# Patient Record
Sex: MALE | Race: WHITE | NOT HISPANIC OR LATINO | Employment: OTHER | URBAN - METROPOLITAN AREA
[De-identification: names, ages, dates, MRNs, and addresses within clinical notes are randomized per-mention and may not be internally consistent; named-entity substitution may affect disease eponyms.]

---

## 2017-01-27 ENCOUNTER — ALLSCRIPTS OFFICE VISIT (OUTPATIENT)
Dept: OTHER | Facility: OTHER | Age: 42
End: 2017-01-27

## 2017-05-23 ENCOUNTER — GENERIC CONVERSION - ENCOUNTER (OUTPATIENT)
Dept: OTHER | Facility: OTHER | Age: 42
End: 2017-05-23

## 2017-05-24 LAB
A/G RATIO (HISTORICAL): 1.4 (ref 1.2–2.2)
ALBUMIN SERPL BCP-MCNC: 4.1 G/DL (ref 3.5–5.5)
ALP SERPL-CCNC: 63 IU/L (ref 39–117)
ALT SERPL W P-5'-P-CCNC: 15 IU/L (ref 0–44)
AST SERPL W P-5'-P-CCNC: 16 IU/L (ref 0–40)
BILIRUB SERPL-MCNC: 0.3 MG/DL (ref 0–1.2)
BUN SERPL-MCNC: 11 MG/DL (ref 6–24)
BUN/CREA RATIO (HISTORICAL): 14 (ref 9–20)
CALCIUM SERPL-MCNC: 9.2 MG/DL (ref 8.7–10.2)
CHLORIDE SERPL-SCNC: 99 MMOL/L (ref 96–106)
CHOLEST SERPL-MCNC: 195 MG/DL (ref 100–199)
CO2 SERPL-SCNC: 27 MMOL/L (ref 18–29)
CREAT SERPL-MCNC: 0.78 MG/DL (ref 0.76–1.27)
EGFR AFRICAN AMERICAN (HISTORICAL): 128 ML/MIN/1.73
EGFR-AMERICAN CALC (HISTORICAL): 111 ML/MIN/1.73
GLUCOSE SERPL-MCNC: 98 MG/DL (ref 65–99)
HBA1C MFR BLD HPLC: 6 % (ref 4.8–5.6)
HDLC SERPL-MCNC: 44 MG/DL
INTERPRETATION (HISTORICAL): NORMAL
LDLC SERPL CALC-MCNC: 135 MG/DL (ref 0–99)
POTASSIUM SERPL-SCNC: 4.2 MMOL/L (ref 3.5–5.2)
SODIUM SERPL-SCNC: 142 MMOL/L (ref 134–144)
TOT. GLOBULIN, SERUM (HISTORICAL): 3 G/DL (ref 1.5–4.5)
TOTAL PROTEIN (HISTORICAL): 7.1 G/DL (ref 6–8.5)
TRIGL SERPL-MCNC: 78 MG/DL (ref 0–149)

## 2017-05-25 ENCOUNTER — GENERIC CONVERSION - ENCOUNTER (OUTPATIENT)
Dept: OTHER | Facility: OTHER | Age: 42
End: 2017-05-25

## 2017-05-26 ENCOUNTER — ALLSCRIPTS OFFICE VISIT (OUTPATIENT)
Dept: OTHER | Facility: OTHER | Age: 42
End: 2017-05-26

## 2017-12-01 ENCOUNTER — ALLSCRIPTS OFFICE VISIT (OUTPATIENT)
Dept: OTHER | Facility: OTHER | Age: 42
End: 2017-12-01

## 2017-12-05 NOTE — PROGRESS NOTES
Assessment    1  Acute maxillary sinusitis, recurrence not specified (461 0) (J01 00)   2  Benign essential hypertension (401 1) (I10)   3  BMI 50 0-59 9, adult (V85 43) (Z68 43)   4  Morbid obesity (278 01) (E66 01)    Plan  Acute maxillary sinusitis, recurrence not specified    · Cefdinir 300 MG Oral Capsule; TAKE 2 CAPSULES DAILY  Morbid obesity    · Begin a limited exercise program ; Status:Complete;   Done: 12XDL9191   · Eat a low fat and low cholesterol diet ; Status:Complete;   Done: 68MTO0286   · Shared Decision Making Aid given; Status:Complete;   Done: 17RDM1146   · Some eating tips that can help you lose weight ; Status:Complete;   Done: 61HVF9579   · We recommend that you bring your body mass index down to 26 ; Status:Complete;  Done: 07HAB0041   · Follow Up if Not Better Evaluation and Treatment  Follow-up  Status: Complete  Done:34Lyw6036    Discussion/Summary  Possible side effects of new medications were reviewed with the patient/guardian today  The treatment plan was reviewed with the patient/guardian  The patient/guardian understands and agrees with the treatment plan      Provider Comments  Provider Comments:   htn stablemucinex dmnew      Chief Complaint  Pt c/o sinus pressure for the past days  er/cma  History of Present Illness  HPI: sinsu pressureeyesall hqud4fftfcwht DMis thick and discolored, yellowallergy medsno temp  cdlcpe after labsstable      Review of Systems   Cardiovascular: no chest pain-- and-- no lower extremity edema  Respiratory: no shortness of breath  Active Problems    1  Acute maxillary sinusitis, recurrence not specified (461 0) (J01 00)   2  Acute URI (465 9) (J06 9)   3  Allergic rhinitis (477 9) (J30 9)   4  Benign essential hypertension (401 1) (I10)   5  BMI 50 0-59 9, adult (V85 43) (Z68 43)   6  Chronic reflux esophagitis (530 11) (K21 0)   7  Colon cancer screening (V76 51) (Z12 11)   8  Depression screening (V79 0) (Z13 89)   9   Gout (274 9) (M10 9) 10  IFG (impaired fasting glucose) (790 21) (R73 01)   11  Immunization due (V05 9) (Z23)   12  Localized, primary osteoarthritis of lower leg, unspecified laterality   13  Morbid obesity (278 01) (E66 01)   14  Obstructive sleep apnea (327 23) (G47 33)   15  Pre-diabetes (790 29) (R73 03)   16  Prostate cancer screening (V76 44) (Z12 5)   17  Screening for cardiovascular, respiratory, and genitourinary diseases  (V81 2,V81 4,V81 6) (Z13 6,Z13 83,Z13 89)   18  Screening for diabetes mellitus (DM) (V77 1) (Z13 1)   19  Thyroid disorder screening (V77 0) (Z13 29)   20  Well adult on routine health check (V70 0) (Z00 00)    Past Medical History  1  _ (465)   2  _ (461)   3  History of Acute bronchitis, unspecified organism (466 0) (J20 9)   4  Acute maxillary sinusitis (461 0) (J01 00)   5  History of Acute maxillary sinusitis (461 0) (J01 00)   6  History of Acute maxillary sinusitis (461 0) (J01 00)   7  History of Acute maxillary sinusitis (461 0) (J01 00)   8  History of Acute maxillary sinusitis (461 0) (J01 00)   9  History of Acute maxillary sinusitis, recurrence not specified (461 0) (J01 00)   10  History of Acute maxillary sinusitis, recurrence not specified (461 0) (J01 00)   11  History of Acute serous otitis media, unspecified laterality   12  Acute upper respiratory infection (465 9) (J06 9)   13  History of Acute URI (465 9) (J06 9)   14  Ankle pain, unspecified laterality   15  History of Cellulitis (682 9) (L03 90)   16  Cough (786 2) (R05)   17  History of Generalized abdominal pain (789 07) (R10 84)   18  History of acute bronchitis (V12 69) (Z87 09)   19  History of acute bronchitis (V12 69) (Z87 09)   20  History of acute otitis media (V12 49) (Z86 69)   21  History of acute sinusitis (V12 69) (Z87 09)   22  History of hypertension (V12 59) (Z86 79)   23  History of seborrheic dermatitis (V13 3) (Z87 2)   24  History of shortness of breath (V13 89) (Z87 898)   25   History of viral infection (V12 09) (Z86 19)   26  History of Joint pain, knee (719 46) (M25 569)   27  History of Late effect of sprain and strain (905 7)   28  History of Loose Body In The Knee (717 6)   29  History of Neoplasm of bone (239 2) (D49 2)   30  Noninfectious gastroenteritis (558 9) (K52 9)   31  Otalgia, unspecified laterality (388 70) (H92 09)   32  History of Palpitations (785 1) (R00 2)   33  History of Patellar tendonitis, unspecified laterality   34  History of Prostate cancer screening (V76 44) (Z12 5)    Family History  Mother    1  Family history of malignant neoplasm of breast (V16 3) (Z80 3)  Father    2  Family history of atrial fibrillation (V17 49) (Z82 49)   3  Family history of sleep apnea (V19 8) (Z82 0)  Family History    4  Family history of Arthritis (V17 7)   5  Family history of Breast Cancer (V16 3)   6  Family history of Gastric Cancer (V16 0)   7  Family history of Liver Cancer  Family History Reviewed: The family history was reviewed and updated today  Social History   · Denied: History of Alcohol Use (History)   · Never A Smoker  The social history was reviewed and updated today  Surgical History    1  History of Cholecystectomy   2  History of Hand Surgery   3  History of Knee Surgery    Current Meds   1  Cetirizine HCl - 10 MG Oral Tablet; TAKE 1 TABLET DAILY AS DIRECTED; Therapy: (Recorded:20Yhw2589) to Recorded   2  DilTIAZem HCl ER Coated Beads 120 MG Oral Capsule Extended Release 24 Hour; take 1 capsule daily; Therapy: 20LZO2282 to (Evaluate:90Poi2370)  Requested for: 84NBI8203; Last Rx:90Gyk7472 Ordered   3  Losartan Potassium-HCTZ 100-12 5 MG Oral Tablet; Take 1 tablet daily; Therapy: 77UBF6680 to (Evaluate:24Fnt0714)  Requested for: 92TKF6713; Last Rx:17Auf7065 Ordered   4  Montelukast Sodium 10 MG Oral Tablet; Take 1 tablet daily; Therapy: 73AAV7259 to (PQAPSYWY:78XBS2889)  Requested for: 32NTS0220; Last Rx:79Bgk2034 Ordered   5   Omega-3 Fish Oil CAPS; take 1 capsule daily; Therapy: (Recorded:94Ang2988) to Recorded   6  Propranolol HCl  MG Oral Capsule Extended Release 24 Hour; take 1 capsule daily; Therapy: 03QTJ7558 to (Evaluate:22Jan2018)  Requested for: 85WBY6048 Recorded   7  Proventil  (90 Base) MCG/ACT Inhalation Aerosol Solution; 2 puffs q4hrs prn, swish and spit after each use; Therapy: 58NKP6739 to (Last Rx:18Oct2017)  Requested for: 67OGR4244 Ordered   8  RaNITidine HCl - 150 MG Oral Capsule; Take 1 capsule twice daily; Therapy: (Recorded:03Faq1675) to Recorded   9  Vitamin C 100 MG Oral Tablet; TAKE 1 TABLET DAILY AS DIRECTED; Therapy: 12JFH8312 to (Ardelle Maple) Recorded   10  Vitamin E 400 UNIT Oral Capsule; 1 DAILY Recorded    Allergies  1  No Known Drug Allergies    Vitals   Recorded: 69RAZ5948 03:15PM   Temperature 98 5 F   Heart Rate 88   Respiration 16   Systolic 028   Diastolic 76   Height 5 ft 10 in   Weight 378 lb    BMI Calculated 54 24   BSA Calculated 2 74       Physical Exam   Constitutional  General appearance: No acute distress, well appearing and well nourished  Eyes  Conjunctiva and lids: No swelling, erythema, or discharge  Pupils and irises: Equal, round and reactive to light  Ears, Nose, Mouth, and Throat  External inspection of ears and nose: Normal    Otoscopic examination: Tympanic membrance translucent with normal light reflex  Canals patent without erythema  Nasal mucosa, septum, and turbinates: Normal without edema or erythema  Oropharynx: Normal with no erythema, edema, exudate or lesions  Pulmonary  Respiratory effort: No increased work of breathing or signs of respiratory distress  Auscultation of lungs: Clear to auscultation, equal breath sounds bilaterally, no wheezes, no rales, no rhonci  Cardiovascular  Auscultation of heart: Normal rate and rhythm, normal S1 and S2, without murmurs  Examination of extremities for edema and/or varicosities: Normal    Abdomen  Abdomen: Abnormal   The abdomen was obese  Lymphatic  Palpation of lymph nodes in neck: No lymphadenopathy  Musculoskeletal  Gait and station: Normal    Digits and nails: Normal without clubbing or cyanosis  Skin  Skin and subcutaneous tissue: Normal without rashes or lesions  Psychiatric  Mood and affect: Normal          Results/Data  PHQ-2 Adult Depression Screening 96UIA8017 03:19PM User, s     Test Name Result Flag Reference   PHQ-2 Adult Depression Score 0       Over the last two weeks, how often have you been bothered by any of the following problems?  Little interest or pleasure in doing things: Not at all - 0 Feeling down, depressed, or hopeless: Not at all - 0   PHQ-2 Adult Depression Screening Negative           Signatures   Electronically signed by : Marge Downey DO; Dec  1 2017  3:42PM EST                       (Author)

## 2018-01-13 VITALS
TEMPERATURE: 98.4 F | HEIGHT: 70 IN | WEIGHT: 315 LBS | RESPIRATION RATE: 20 BRPM | DIASTOLIC BLOOD PRESSURE: 78 MMHG | BODY MASS INDEX: 45.1 KG/M2 | HEART RATE: 82 BPM | SYSTOLIC BLOOD PRESSURE: 128 MMHG

## 2018-01-13 VITALS
HEIGHT: 70 IN | RESPIRATION RATE: 24 BRPM | BODY MASS INDEX: 45.1 KG/M2 | DIASTOLIC BLOOD PRESSURE: 76 MMHG | HEART RATE: 84 BPM | WEIGHT: 315 LBS | SYSTOLIC BLOOD PRESSURE: 120 MMHG | TEMPERATURE: 99.2 F

## 2018-01-13 NOTE — PROGRESS NOTES
Assessment    1  Benign essential hypertension (401 1) (I10)   2  BMI 50 0-59 9, adult (V85 43) (Z68 43)   3  Chronic reflux esophagitis (530 11) (K21 0)   4  Allergic rhinitis (477 9) (J30 9)   5  Morbid obesity (278 01) (E66 01)   6  Gout (274 9) (M10 9)   7  Encounter for preventive health examination (V70 0) (Z00 00)   8  Pre-diabetes (790 29) (R73 03)   9  Obstructive sleep apnea (327 23) (G47 33)    Plan  Allergic rhinitis    · Levocetirizine Dihydrochloride 5 MG Oral Tablet   · Desloratadine 5 MG Oral Tablet; 1 every day   · Montelukast Sodium 10 MG Oral Tablet; Take 1 tablet daily  Benign essential hypertension    · DiltiaZEM HCl ER Coated Beads 120 MG Oral Capsule Extended Release 24  Hour; take 1 capsule daily   · Losartan Potassium-HCTZ 100-12 5 MG Oral Tablet; Take 1 tablet daily   · Propranolol HCl  MG Oral Capsule Extended Release 24 Hour; take 1  capsule daily   · Begin a limited exercise program ; Status:Complete;   Done: 45JTV5996   · Restrict the salt in your diet by avoiding highly salted foods ; Status:Complete;   Done:  21UBF4554   · Shared Decision Making Aid given; Status:Complete;   Done: 28SXA8499   · Follow-up visit in 6 months Evaluation and Treatment  Follow-up  Status: Complete -  Scheduling  Done: 93UAX9934 11:30AM  Benign essential hypertension, Chronic reflux esophagitis, Morbid obesity, Pre-diabetes    · (1) COMPREHENSIVE METABOLIC PANEL; Status:Active; Requested for:15Vij3925;    · (1) HEMOGLOBIN A1C; Status:Active; Requested for:80Nvr1107;    · (1) LIPID PANEL FASTING W DIRECT LDL REFLEX; Status:Active; Requested  for:18Xjw2644;   Immunization due    · Adacel 5-2-15 5 LF-MCG/0 5 Intramuscular Suspension    Chief Complaint  pt present for CPE  ac/cma      History of Present Illness  HM, Adult Male: The patient is being seen for a health maintenance evaluation  General Health: The patient's health since the last visit is described as good   Immunizations status: not up to date   tetanus due  Lifestyle:  He has weight concerns  He does not exercise regularly  He does not use tobacco    Screening:      Review of Systems    Cardiovascular: no chest pain  Respiratory: no shortness of breath  Gastrointestinal: no abdominal pain  Active Problems    1  Allergic rhinitis (477 9) (J30 9)   2  Benign essential hypertension (401 1) (I10)   3  BMI 50 0-59 9, adult (V85 43) (Z68 43)   4  Chronic reflux esophagitis (530 11) (K21 0)   5  Colon cancer screening (V76 51) (Z12 11)   6  Depression screening (V79 0) (Z13 89)   7  Gout (274 9) (M10 9)   8  Immunization due (V05 9) (Z23)   9  Localized, primary osteoarthritis of lower leg, unspecified laterality   10  Morbid obesity (278 01) (E66 01)   11  Obstructive sleep apnea (327 23) (G47 33)   12  Pre-diabetes (790 29) (R73 03)   13  Prostate cancer screening (V76 44) (Z12 5)   14  Screening for cardiovascular condition (V81 2) (Z13 6)   15  Screening for diabetes mellitus (DM) (V77 1) (Z13 1)   16  Thyroid disorder screening (V77 0) (Z13 29)   17   Well adult on routine health check (V70 0) (Z00 00)    Past Medical History    · _ (465)   · _ (461)   · History of Acute bronchitis, unspecified organism (466 0) (J20 9)   · Acute maxillary sinusitis (461 0) (J01 00)   · History of Acute maxillary sinusitis (461 0) (J01 00)   · History of Acute maxillary sinusitis (461 0) (J01 00)   · History of Acute maxillary sinusitis (461 0) (J01 00)   · History of Acute maxillary sinusitis (461 0) (J01 00)   · History of Acute maxillary sinusitis, recurrence not specified (461 0) (J01 00)   · History of Acute serous otitis media, unspecified laterality   · Acute upper respiratory infection (465 9) (J06 9)   · Ankle pain, unspecified laterality   · History of Cellulitis (682 9) (L03 90)   · Cough (786 2) (R05)   · History of Generalized abdominal pain (789 07) (R10 84)   · History of acute bronchitis (V12 69) (Z87 09)   · History of acute bronchitis (V12 69) (Z87 09)   · History of acute otitis media (V12 49) (Z86 69)   · History of acute sinusitis (V12 69) (Z87 09)   · History of hypertension (V12 59) (Z86 79)   · History of seborrheic dermatitis (V13 3) (Z87 2)   · History of shortness of breath (V13 89) (U92 398)   · History of viral infection (V12 09) (Z86 19)   · History of Joint pain, knee (719 46) (M25 569)   · History of Late effect of sprain and strain (905 7)   · History of Loose Body In The Knee (717 6)   · History of Neoplasm of bone (239 2) (D49 2)   · Noninfectious gastroenteritis (558 9) (K52 9)   · Otalgia, unspecified laterality (388 70) (H92 09)   · History of Palpitations (785 1) (R00 2)   · History of Patellar tendonitis, unspecified laterality   · History of Prostate cancer screening (V76 44) (Z12 5)    Surgical History    · History of Cholecystectomy   · History of Hand Surgery   · History of Knee Surgery    Family History  Mother    · Family history of malignant neoplasm of breast (V16 3) (Z80 3)  Father    · Family history of atrial fibrillation (V17 49) (Z82 49)   · Family history of sleep apnea (V19 8) (Z82 0)  Family History    · Family history of Arthritis (V17 7)   · Family history of Breast Cancer (V16 3)   · Family history of Gastric Cancer (V16 0)   · Family history of Liver Cancer    Social History    · Denied: History of Alcohol Use (History)   · Never A Smoker    Current Meds   1  DiltiaZEM HCl ER Coated Beads 120 MG Oral Capsule Extended Release 24 Hour; take   1 capsule daily; Therapy: 62LYL8007 to (Evaluate:00Bna6985)  Requested for: 85IHN6810; Last   Rx:07Xmq6634 Ordered   2  Levocetirizine Dihydrochloride 5 MG Oral Tablet; Take 1 tablet daily; Therapy: 90GBN8426 to (Evaluate:98Xld5923)  Requested for: 24YDM7329; Last   Rx:23Hkn8895 Ordered   3  Losartan Potassium-HCTZ 100-12 5 MG Oral Tablet; Take 1 tablet daily; Therapy: 07FOG7387 to (Evaluate:25Exq3125)  Requested for: 82BKT1424; Last   Rx:05Jdk8591 Ordered   4  Montelukast Sodium 10 MG Oral Tablet; Take 1 tablet daily; Therapy: 89CWB8410 to (Evaluate:64Hjz7498)  Requested for: 08ZDO0534; Last   Rx:61Hpf6369 Ordered   5  Omega-3 Fish Oil CAPS; Therapy: (Recorded:58Aea2594) to Recorded   6  Pantoprazole Sodium 40 MG Oral Tablet Delayed Release; Take 1 tablet daily; Therapy: (Recorded:19Vxp4636) to Recorded   7  Propranolol HCl  MG Oral Capsule Extended Release 24 Hour; take 1 capsule   daily; Therapy: 79MSX2912 to (Evaluate:52Sdp2906)  Requested for: 32BFV7814; Last   Rx:54Bnk3203 Ordered   8  Vitamin C 100 MG Oral Tablet; TAKE 1 TABLET DAILY AS DIRECTED; Therapy: 65UGJ1399 to (Kishan Aguiar) Recorded   9  Vitamin E 400 UNIT Oral Capsule; 1 DAILY Recorded    Allergies    1  No Known Drug Allergies    Vitals   Recorded: 00GLC5060 69:63KB   Systolic 950   Diastolic 78   Heart Rate 82   Respiration 22   Temperature 98 F   Height 5 ft 10 in   Weight 381 lb    BMI Calculated 54 67   BSA Calculated 2 75     Physical Exam    Constitutional   General appearance: No acute distress, well appearing and well nourished  Eyes   Conjunctiva and lids: No erythema, swelling or discharge  Pupils and irises: Equal, round, reactive to light  Ears, Nose, Mouth, and Throat   External inspection of ears and nose: Normal     Otoscopic examination: Tympanic membranes translucent with normal light reflex  Canals patent without erythema  Hearing: Normal     Nasal mucosa, septum, and turbinates: Normal without edema or erythema  Lips, teeth, and gums: Normal, good dentition  Oropharynx: Normal with no erythema, edema, exudate or lesions  Neck   Neck: Supple, symmetric, trachea midline, no masses  Thyroid: Normal, no thyromegaly  Pulmonary   Respiratory effort: No increased work of breathing or signs of respiratory distress  Auscultation of lungs: Clear to auscultation      Cardiovascular   Auscultation of heart: Normal rate and rhythm, normal S1 and S2, no murmurs  Carotid pulses: 2+ bilaterally  Pedal pulses: 2+ bilaterally  Examination of extremities for edema and/or varicosities: Normal     Abdomen   Abdomen: Abnormal   The abdomen was rounded and obese  Examination for hernias: No hernias appreciated  Genitourinary   Scrotal contents: Normal testes, no masses  Penis: Normal, no lesions  Digital rectal exam of prostate: Normal size, no masses  Lymphatic   Palpation of lymph nodes in neck: No lymphadenopathy  Palpation of lymph nodes in groin: No lymphadenopathy  Musculoskeletal   Gait and station: Normal     Inspection/palpation of digits and nails: Normal without clubbing or cyanosis  Inspection/palpation of joints, bones, and muscles: Normal     Range of motion: Normal     Stability: Normal     Muscle strength/tone: Normal     Skin   Skin and subcutaneous tissue: Normal without rashes or lesions  Palpation of skin and subcutaneous tissue: Normal turgor  Neurologic   Reflexes: 2+ and symmetric  Sensation: No sensory loss  Psychiatric   Judgment and insight: Normal     Mood and affect: Normal        Results/Data  PHQ-2 Adult Depression Screening 90TJW1620 12:01AM Sabas Collado     Test Name Result Flag Reference   PHQ-2 Adult Depression Score 0     Over the last two weeks, how often have you been bothered by any of the following problems? Little interest or pleasure in doing things: Not at all - 0  Feeling down, depressed, or hopeless: Not at all - 0   PHQ-2 Adult Depression Screening Negative         Procedure    Procedure: Visual Acuity Test    Indication: routine screening  Inforrmation supplied by a Snellen chart  Results: 20/13 in both eyes without corrective device, 20/13 in the right eye without corrective device, 20/13 in the left eye without corrective device      Provider Comments  Provider Comments:   landry?       Future Appointments    Date/Time Provider Specialty Site   05/15/2017 11:00 AM Farzad Almodovar, [address]     Signatures   Electronically signed by : Kristi Bledsoe DO; Nov 16 2016 12:05AM EST                       (Author)

## 2018-01-15 NOTE — RESULT NOTES
Verified Results  (1) COMPREHENSIVE METABOLIC PANEL 58IGN9451 76:01BO Elizabeth Devine     Test Name Result Flag Reference   Glucose, Serum 98 mg/dL  65-99   BUN 11 mg/dL  6-24   Creatinine, Serum 0 78 mg/dL  0 76-1 27   BUN/Creatinine Ratio 14  9-20   Sodium, Serum 142 mmol/L  134-144   Potassium, Serum 4 2 mmol/L  3 5-5 2   Chloride, Serum 99 mmol/L     Carbon Dioxide, Total 27 mmol/L  18-29   Calcium, Serum 9 2 mg/dL  8 7-10 2   Protein, Total, Serum 7 1 g/dL  6 0-8 5   Albumin, Serum 4 1 g/dL  3 5-5 5   Globulin, Total 3 0 g/dL  1 5-4 5   A/G Ratio 1 4  1 2-2 2   Bilirubin, Total 0 3 mg/dL  0 0-1 2   Alkaline Phosphatase, S 63 IU/L     AST (SGOT) 16 IU/L  0-40   ALT (SGPT) 15 IU/L  0-44   eGFR If NonAfricn Am 111 mL/min/1 73  >59   eGFR If Africn Am 128 mL/min/1 73  >59     [facility]) Cardiovascular Risk Assessment 68GXO3151 12:14PM Elizabeth Devine     Test Name Result Flag Reference   Interpretation Note     Supplement report is available  PDF Image

## 2018-01-16 NOTE — RESULT NOTES
Verified Results  (1) COMPREHENSIVE METABOLIC PANEL 39DPE6176 96:64PM Vani Serve     Test Name Result Flag Reference   Glucose, Serum 106 mg/dL H 65-99   BUN 18 mg/dL  6-24   Creatinine, Serum 0 87 mg/dL  0 76-1 27   eGFR If NonAfricn Am 106 mL/min/1 73  >59   eGFR If Africn Am 123 mL/min/1 73  >59   BUN/Creatinine Ratio 21 H 9-20   Sodium, Serum 140 mmol/L  136-144   Potassium, Serum 4 5 mmol/L  3 5-5 2   Chloride, Serum 97 mmol/L     Carbon Dioxide, Total 27 mmol/L  18-29   Calcium, Serum 9 6 mg/dL  8 7-10 2   Protein, Total, Serum 7 7 g/dL  6 0-8 5   Albumin, Serum 4 4 g/dL  3 5-5 5   Globulin, Total 3 3 g/dL  1 5-4 5   A/G Ratio 1 3  1 1-2 5   Bilirubin, Total 0 7 mg/dL  0 0-1 2   Alkaline Phosphatase, S 75 IU/L     AST (SGOT) 20 IU/L  0-40   ALT (SGPT) 24 IU/L  0-44     (1) HEMOGLOBIN A1C 03GWD5024 10:43AM Vani Serve     Test Name Result Flag Reference   Hemoglobin A1c 5 9 % H 4 8-5 6   Pre-diabetes: 5 7 - 6 4           Diabetes: >6 4           Glycemic control for adults with diabetes: <7 0     (1) LIPID PANEL FASTING W DIRECT LDL REFLEX 60SOT1686 10:43AM Vani Serve     Test Name Result Flag Reference   Cholesterol, Total 195 mg/dL  100-199   Triglycerides 107 mg/dL  0-149   HDL Cholesterol 41 mg/dL  >39   According to ATP-III Guidelines, HDL-C >59 mg/dL is considered a  negative risk factor for CHD  LDL Cholesterol Calc 133 mg/dL H 0-99     (1) PSA (SCREEN) (Dx V76 44 Screen for Prostate Cancer) 96NDK9270 10:43AM Vani Serve     Test Name Result Flag Reference   Prostate Specific Ag, Serum 1 2 ng/mL  0 0-4 0   Roche ECLIA methodology  According to the American U[employer] Association, Serum PSA should  decrease and remain at undetectable levels after radical  prostatectomy  The AUA defines biochemical recurrence as an initial  PSA value 0 2 ng/mL or greater followed by a subsequent confirmatory  PSA value 0 2 ng/mL or greater    Values obtained with different assay methods or kits cannot be used  interchangeably  Results cannot be interpreted as absolute evidence  of the presence or absence of malignant disease  (1) URIC ACID 30BLA4034 10:43AM Maria Teresa Hair     Test Name Result Flag Reference   Uric Acid, Serum 7 3 mg/dL  3 7-8 6   Therapeutic target for gout patients: <6 0       Discussion/Summary   Please keep your office visit as scheduled     Dr Abdiaziz Mcmanus

## 2018-01-16 NOTE — PROGRESS NOTES
Assessment    1  Benign essential hypertension (401 1) (I10)   2  Morbid obesity (278 01) (E66 01)   3  Obstructive sleep apnea (327 23) (G47 33)   4  Acute maxillary sinusitis, recurrence not specified (461 0) (J01 00)    Plan  Acute maxillary sinusitis, recurrence not specified    · Levofloxacin 500 MG Oral Tablet; TAKE 1 TABLET DAILY AS DIRECTED   · Avoid sun exposure ; Status:Complete;   Done: 99ASI9255   · Drink plenty of fluids while you are not feeling well ; Status:Complete;   Done: 75PTF1098   · Shared Decision Making Aid given; Status:Complete;   Done: 84YEL8444    Discussion/Summary    Consider saxenda vs contrave in future  htn/gerd stable  The patient was counseled regarding risk factor reductions, impressions  Possible side effects of new medications were reviewed with the patient/guardian today  Chief Complaint  pt present c/o cough  hg      History of Present Illness  HPI: took zpack  16d ago  mucinex DM  still ongoing  yellow/green mucus  chills/fever  wife sick  no n/v/c/d  no sob  htn/gerd stable      Review of Systems    Cardiovascular: no chest pain  Respiratory: no wheezing  Active Problems    1  Acute bronchitis, unspecified organism (466 0) (J20 9)   2  Allergic rhinitis (477 9) (J30 9)   3  Benign essential hypertension (401 1) (I10)   4  Chronic reflux esophagitis (530 11) (K21 0)   5  Depression screening (V79 0) (Z13 89)   6  Gout (274 9) (M10 9)   7  Immunization due (V05 9) (Z23)   8  Localized, primary osteoarthritis of lower leg, unspecified laterality   9  Loose Body In The Knee (717 6)   10  Morbid obesity (278 01) (E66 01)   11  Obstructive sleep apnea (327 23) (G47 33)   12  Prediabetes (790 29) (R73 09)   13  Prostate cancer screening (V76 44) (Z12 5)   14  Screening for cardiovascular condition (V81 2) (Z13 6)   15  Screening for diabetes mellitus (DM) (V77 1) (Z13 1)   16  Screening for lipid disorders (V77 91) (Z13 220)   17   Thyroid disorder screening (V77 0) (Z13 29)   18  Well adult on routine health check (V70 0) (Z00 00)    Past Medical History    1  _ (465)   2  _ (461)   3  Acute maxillary sinusitis (461 0) (J01 00)   4  History of Acute maxillary sinusitis (461 0) (J01 00)   5  History of Acute maxillary sinusitis (461 0) (J01 00)   6  History of Acute maxillary sinusitis (461 0) (J01 00)   7  History of Acute maxillary sinusitis (461 0) (J01 00)   8  History of Acute serous otitis media, unspecified laterality   9  Acute upper respiratory infection (465 9) (J06 9)   10  Ankle pain, unspecified laterality (719 47) (M25 579)   11  History of Cellulitis (682 9) (L03 90)   12  Cough (786 2) (R05)   13  History of Generalized abdominal pain (789 07) (R10 84)   14  History of acute bronchitis (V12 69) (Z87 09)   15  History of acute bronchitis (V12 69) (Z87 09)   16  History of acute otitis media (V12 49) (Z86 69)   17  History of acute sinusitis (V12 69) (Z87 09)   18  History of hypertension (V12 59) (Z86 79)   19  History of seborrheic dermatitis (V13 3) (Z87 2)   20  History of shortness of breath (V13 89) (Z87 898)   21  History of viral infection (V12 09) (Z86 19)   22  History of Joint pain, knee (719 46) (M25 569)   23  History of Late effect of sprain and strain (905 7)   24  History of Neoplasm of bone (239 2) (D49 2)   25  Noninfectious gastroenteritis (558 9) (K52 9)   26  Otalgia, unspecified laterality (388 70) (H92 09)   27  History of Palpitations (785 1) (R00 2)   28  History of Patellar tendonitis, unspecified laterality (726 64) (M76 50)   29  History of Prostate cancer screening (V76 44) (Z12 5)    Family History    1  Family history of malignant neoplasm of breast (V16 3) (Z80 3)    2  Family history of atrial fibrillation (V17 49) (Z82 49)   3  Family history of sleep apnea (V19 8) (Z82 0)    4  Family history of Arthritis (V17 7)   5  Family history of Breast Cancer (V16 3)   6  Family history of Gastric Cancer (V16 0)   7   Family history of Liver Cancer  Family History Reviewed: The family history was reviewed and updated today  Social History    · Denied: History of Alcohol Use (History)   · Never A Smoker  The social history was reviewed and updated today  Surgical History    1  History of Cholecystectomy   2  History of Hand Surgery   3  History of Knee Surgery    Current Meds   1  Diltiazem HCl ER Coated Beads 120 MG Oral Capsule Extended Release 24 Hour; take   1 capsule daily; Therapy: 61ZNN4547 to (Evaluate:63Tss3865)  Requested for: 98SGJ1570; Last   Rx:27Lnu3033 Ordered   2  Levocetirizine Dihydrochloride 5 MG Oral Tablet; Take 1 tablet daily; Therapy: 10UXO2066 to (Evaluate:94Ydf0849)  Requested for: 56GZG2309; Last   Rx:63Ewy2559 Ordered   3  Losartan Potassium-HCTZ 100-12 5 MG Oral Tablet; Take 1 tablet daily; Therapy: 37SPP8838 to (Evaluate:35Icn2472)  Requested for: 38LUH9781; Last   Rx:87Jeb0322 Ordered   4  Montelukast Sodium 10 MG Oral Tablet; Take 1 tablet daily; Therapy: 03SCJ3539 to (Evaluate:10Tyx4586)  Requested for: 45AVV8606; Last   Rx:99Hhl6882 Ordered   5  Omega-3 Fish Oil CAPS; Therapy: (Recorded:86Qni0902) to Recorded   6  Omeprazole 40 MG Oral Capsule Delayed Release; take 1 capsule daily; Therapy: 69NCS7220 to (Evaluate:01Mtr3397)  Requested for: 13CHN0183; Last   Rx:28Ocw2894 Ordered   7  Propranolol HCl  MG Oral Capsule Extended Release 24 Hour; take 1 capsule   daily; Therapy: 69PCV2110 to (Evaluate:30Jdf1934)  Requested for: 60LWU5763; Last   Rx:84Lnc4730 Ordered   8  Vitamin C 100 MG Oral Tablet; TAKE 1 TABLET DAILY AS DIRECTED; Therapy: 08ICX8587 to (Casimiro Owens) Recorded   9  Vitamin E 400 UNIT Oral Capsule; 1 DAILY Recorded    Allergies    1   No Known Drug Allergies    Vitals   Recorded: 26IYR0774 11:23AM   Temperature 98 8 F   Heart Rate 86   Respiration 22   Systolic 102   Diastolic 88   Height 5 ft 10 in   Weight 381 lb    BMI Calculated 54 67   BSA Calculated 2 74 Physical Exam    Constitutional   General appearance: No acute distress, well appearing and well nourished  Eyes   Conjunctiva and lids: No swelling, erythema, or discharge  Pupils and irises: Equal, round and reactive to light  Ears, Nose, Mouth, and Throat   External inspection of ears and nose: Normal     Otoscopic examination: Tympanic membrance translucent with normal light reflex  Canals patent without erythema  Nasal mucosa, septum, and turbinates: Abnormal   The bilateral nasal mucosa was boggy, edematous and red  Oropharynx: Normal with no erythema, edema, exudate or lesions  Pulmonary   Respiratory effort: No increased work of breathing or signs of respiratory distress  Auscultation of lungs: Clear to auscultation, equal breath sounds bilaterally, no wheezes, no rales, no rhonci  Cardiovascular   Auscultation of heart: Normal rate and rhythm, normal S1 and S2, without murmurs  Examination of extremities for edema and/or varicosities: Normal     Carotid pulses: Normal     Abdomen   Abdomen: Non-tender, no masses  obese  Lymphatic   Palpation of lymph nodes in neck: No lymphadenopathy  Musculoskeletal   Gait and station: Normal     Digits and nails: Normal without clubbing or cyanosis  Skin   Skin and subcutaneous tissue: Normal without rashes or lesions      Psychiatric   Mood and affect: Normal          Future Appointments    Date/Time Provider Specialty Site   04/18/2016 11:30 AM [address] [place] [address], [facility]     Signatures   Electronically signed by : Rosa Walker DO; Jan 27 2016  9:39PM EST                       (Author)

## 2018-01-23 ENCOUNTER — ALLSCRIPTS OFFICE VISIT (OUTPATIENT)
Dept: OTHER | Facility: OTHER | Age: 43
End: 2018-01-23

## 2018-01-23 VITALS
TEMPERATURE: 98.5 F | DIASTOLIC BLOOD PRESSURE: 76 MMHG | WEIGHT: 315 LBS | BODY MASS INDEX: 45.1 KG/M2 | SYSTOLIC BLOOD PRESSURE: 118 MMHG | HEART RATE: 88 BPM | RESPIRATION RATE: 16 BRPM | HEIGHT: 70 IN

## 2018-01-24 NOTE — PROGRESS NOTES
Assessment    1  Acute maxillary sinusitis, recurrence not specified (461 0) (J01 00)   2  Benign essential hypertension (401 1) (I10)   3  BMI 50 0-59 9, adult (V85 43) (R30 72)    Plan  Acute maxillary sinusitis, recurrence not specified    · Cefdinir 300 MG Oral Capsule; TAKE 2 CAPSULES DAILY   · Follow Up if Not Better Evaluation and Treatment  Follow-up  Status: Complete  Done:  10GIK7509   · Avoid sun exposure ; Status:Complete;   Done: 86FRH4826   · Drink plenty of fluids while you are not feeling well ; Status:Complete;   Done: 81ZXX3513   · Shared Decision Making Aid given; Status:Complete;   Done: 11HEQ6673  Benign essential hypertension, IFG (impaired fasting glucose), Pre-diabetes, Screening  for cardiovascular, respiratory, and genitourinary diseases, Screening for diabetes  mellitus (DM)    · (1) HEMOGLOBIN A1C; Status:Active; Requested RZJ:77TNZ7431; Benign essential hypertension, Pre-diabetes, Screening for cardiovascular, respiratory,  and genitourinary diseases, Screening for diabetes mellitus (DM)    · (1) COMPREHENSIVE METABOLIC PANEL; Status:Active; Requested HKT:04MRK1230;   Gout    · (1) URIC ACID; Status:Active; Requested KCL:20XOR1428;   IFG (impaired fasting glucose), Prostate cancer screening    · (1) PSA (SCREEN) (Dx V76 44 Screen for Prostate Cancer); Status:Active; Requested  OOH:18PDC2327;   IFG (impaired fasting glucose), Screening for cardiovascular, respiratory, and  genitourinary diseases    · (1) LIPID PANEL FASTING W DIRECT LDL REFLEX; Status:Active; Requested  ZXH:97LHZ6246;     Chief Complaint  Pt c/o body ache, fatigue and sinus pressure for the past days  er/cma  History of Present Illness  HPI: wife sick last week  1w  uri sx  christa  cough  nasal congestion  better then worse  used otc  mucinex DM  no n/v/c/d  nasal yellow lately       Active Problems    1  Acute maxillary sinusitis, recurrence not specified (461 0) (J01 00)   2   Allergic rhinitis (477 9) (J30 9) 3  Benign essential hypertension (401 1) (I10)   4  BMI 50 0-59 9, adult (V85 43) (Z68 43)   5  Chronic reflux esophagitis (530 11) (K21 0)   6  Colon cancer screening (V76 51) (Z12 11)   7  Depression screening (V79 0) (Z13 89)   8  Gout (274 9) (M10 9)   9  IFG (impaired fasting glucose) (790 21) (R73 01)   10  Immunization due (V05 9) (Z23)   11  Localized, primary osteoarthritis of lower leg, unspecified laterality   12  Morbid obesity (278 01) (E66 01)   13  Obstructive sleep apnea (327 23) (G47 33)   14  Pre-diabetes (790 29) (R73 03)   15  Prostate cancer screening (V76 44) (Z12 5)   16  Screening for cardiovascular, respiratory, and genitourinary diseases    (V81 2,V81 4,V81 6) (Z13 6,Z13 83,Z13 89)   17  Screening for diabetes mellitus (DM) (V77 1) (Z13 1)   18  Thyroid disorder screening (V77 0) (Z13 29)   19  Well adult on routine health check (V70 0) (Z00 00)    Past Medical History    1  _ (465)   2  _ (461)   3  History of Acute bronchitis, unspecified organism (466 0) (J20 9)   4  Acute maxillary sinusitis (461 0) (J01 00)   5  History of Acute maxillary sinusitis (461 0) (J01 00)   6  History of Acute maxillary sinusitis (461 0) (J01 00)   7  History of Acute maxillary sinusitis (461 0) (J01 00)   8  History of Acute maxillary sinusitis (461 0) (J01 00)   9  History of Acute maxillary sinusitis, recurrence not specified (461 0) (J01 00)   10  History of Acute maxillary sinusitis, recurrence not specified (461 0) (J01 00)   11  History of Acute serous otitis media, unspecified laterality   12  Acute upper respiratory infection (465 9) (J06 9)   13  History of Acute URI (465 9) (J06 9)   14  History of Acute URI (465 9) (J06 9)   15  Ankle pain, unspecified laterality   16  History of Cellulitis (682 9) (L03 90)   17  Cough (786 2) (R05)   18  History of Generalized abdominal pain (789 07) (R10 84)   19  History of acute bronchitis (V12 69) (Z87 09)   20   History of acute bronchitis (V12 69) (Z87 09) 21  History of acute otitis media (V12 49) (Z86 69)   22  History of acute sinusitis (V12 69) (Z87 09)   23  History of hypertension (V12 59) (Z86 79)   24  History of seborrheic dermatitis (V13 3) (Z87 2)   25  History of shortness of breath (V13 89) (Z87 898)   26  History of viral infection (V12 09) (Z86 19)   27  History of Joint pain, knee (719 46) (M25 569)   28  History of Late effect of sprain and strain (905 7)   29  History of Loose Body In The Knee (717 6)   30  History of Neoplasm of bone (239 2) (D49 2)   31  Noninfectious gastroenteritis (558 9) (K52 9)   32  Otalgia, unspecified laterality (388 70) (H92 09)   33  History of Palpitations (785 1) (R00 2)   34  History of Patellar tendonitis, unspecified laterality   35  History of Prostate cancer screening (V76 44) (Z12 5)    Family History  Mother    1  Family history of malignant neoplasm of breast (V16 3) (Z80 3)  Father    2  Family history of atrial fibrillation (V17 49) (Z82 49)   3  Family history of sleep apnea (V19 8) (Z82 0)  Family History    4  Family history of Arthritis (V17 7)   5  Family history of Breast Cancer (V16 3)   6  Family history of Gastric Cancer (V16 0)   7  Family history of Liver Cancer  Family History Reviewed: The family history was reviewed and updated today  Social History    · Denied: History of Alcohol Use (History)   · Never A Smoker  The social history was reviewed and updated today  Surgical History    1  History of Cholecystectomy   2  History of Hand Surgery   3  History of Knee Surgery    Current Meds   1  Cetirizine HCl - 10 MG Oral Tablet; TAKE 1 TABLET DAILY AS DIRECTED; Therapy: (Recorded:22Rjj9303) to Recorded   2  DilTIAZem HCl ER Coated Beads 120 MG Oral Capsule Extended Release 24 Hour;   take 1 capsule daily; Therapy: 48GXS9792 to (Evaluate:58Lnp8133)  Requested for: 35OYV1294; Last   Rx:28Hpr2788 Ordered   3  Losartan Potassium-HCTZ 100-12 5 MG Oral Tablet;  Take 1 tablet daily; Therapy: 18HCQ9470 to (Evaluate:73Lzq3603)  Requested for: 00JKQ7806; Last   Rx:18Jmo4182 Ordered   4  Montelukast Sodium 10 MG Oral Tablet; Take 1 tablet daily; Therapy: 48DBD8763 to (XQELCDGT:63CKV9687)  Requested for: 40LIH2148; Last   Rx:26Imd5405 Ordered   5  Omega-3 Fish Oil CAPS; take 1 capsule daily; Therapy: (Recorded:43Zki4697) to Recorded   6  Propranolol HCl  MG Oral Capsule Extended Release 24 Hour; take 1 capsule   daily; Therapy: 42HBK0067 to (Evaluate:22Jan2018)  Requested for: 93JVS8880 Recorded   7  Proventil  (90 Base) MCG/ACT Inhalation Aerosol Solution; 2 puffs q4hrs prn,   swish and spit after each use; Therapy: 03TWY3181 to (Last Rx:18Oct2017)  Requested for: 28ZOQ7617 Ordered   8  RaNITidine HCl - 150 MG Oral Capsule; Take 1 capsule twice daily; Therapy: (Recorded:65Ohf5031) to Recorded   9  Vitamin C 100 MG Oral Tablet; TAKE 1 TABLET DAILY AS DIRECTED; Therapy: 47FYG0765 to (Arlene Cuff) Recorded   10  Vitamin E 400 UNIT Oral Capsule; 1 DAILY Recorded    Allergies    1  No Known Drug Allergies    Vitals   Recorded: 65ZBV1627 12:41PM   Temperature 98 9 F   Heart Rate 80   Respiration 20   Systolic 762   Diastolic 88   Height 5 ft 10 in   Weight Unobtainable Yes     Physical Exam    Constitutional   General appearance: No acute distress, well appearing and well nourished  Eyes   Conjunctiva and lids: No swelling, erythema, or discharge  Pupils and irises: Equal, round and reactive to light  Ears, Nose, Mouth, and Throat   External inspection of ears and nose: Normal     Otoscopic examination: Tympanic membrance translucent with normal light reflex  Canals patent without erythema  Nasal mucosa, septum, and turbinates: Abnormal   The bilateral nasal mucosa was boggy, edematous and red  Oropharynx: Normal with no erythema, edema, exudate or lesions  Pulmonary   Respiratory effort: No increased work of breathing or signs of respiratory distress  Auscultation of lungs: Clear to auscultation, equal breath sounds bilaterally, no wheezes, no rales, no rhonci  Cardiovascular   Auscultation of heart: Normal rate and rhythm, normal S1 and S2, without murmurs  Examination of extremities for edema and/or varicosities: Normal     Carotid pulses: Normal     Abdomen   Abdomen: Non-tender, no masses  Lymphatic   Palpation of lymph nodes in neck: No lymphadenopathy  Musculoskeletal   Gait and station: Normal     Digits and nails: Normal without clubbing or cyanosis  Inspection/palpation of joints, bones, and muscles: Normal     Skin   Skin and subcutaneous tissue: Normal without rashes or lesions  Psychiatric   Mood and affect: Normal          Message  Return to work or school:   Christine Key is under my professional care   He was seen in my office on 1/23/18             Future Appointments    Date/Time Provider Specialty Site   02/07/2018 11:00 AM Yanet Uribe, [address] [place] [address]     Signatures   Electronically signed by : Farhad Beltran DO; Jan 23 2018  1:13PM EST                       (Author)

## 2018-01-24 NOTE — MISCELLANEOUS
Message  Return to work or school:   Riki Elizabethalexey is under my professional care   He was seen in my office on 1/23/18             Future Appointments    Signatures   Electronically signed by : Bonnie Arias DO; Jan 23 2018  1:13PM EST                       (Author)

## 2018-02-09 RX ORDER — ALBUTEROL SULFATE 90 UG/1
2 AEROSOL, METERED RESPIRATORY (INHALATION) EVERY 4 HOURS PRN
COMMUNITY
Start: 2014-03-24 | End: 2020-02-05 | Stop reason: SDUPTHER

## 2018-02-09 RX ORDER — CETIRIZINE HYDROCHLORIDE 10 MG/1
1 TABLET ORAL DAILY
COMMUNITY

## 2018-02-09 RX ORDER — RANITIDINE 150 MG/1
150 TABLET ORAL 2 TIMES DAILY
Refills: 0 | COMMUNITY
Start: 2017-12-27 | End: 2020-02-05

## 2018-02-09 RX ORDER — LOSARTAN POTASSIUM AND HYDROCHLOROTHIAZIDE 12.5; 1 MG/1; MG/1
1 TABLET ORAL DAILY
COMMUNITY
Start: 2012-06-25 | End: 2018-04-19

## 2018-02-09 RX ORDER — RANITIDINE 150 MG/1
1 CAPSULE ORAL 2 TIMES DAILY
COMMUNITY
End: 2018-04-19

## 2018-02-09 RX ORDER — PROPRANOLOL HYDROCHLORIDE 160 MG/1
1 CAPSULE, EXTENDED RELEASE ORAL DAILY
COMMUNITY
Start: 2012-07-17 | End: 2019-03-25

## 2018-02-09 RX ORDER — MONTELUKAST SODIUM 10 MG/1
1 TABLET ORAL DAILY
COMMUNITY
Start: 2014-03-24 | End: 2018-04-19

## 2018-02-09 RX ORDER — DILTIAZEM HYDROCHLORIDE 120 MG/1
1 CAPSULE, COATED, EXTENDED RELEASE ORAL DAILY
COMMUNITY
Start: 2013-06-24 | End: 2018-04-10 | Stop reason: SDUPTHER

## 2018-02-19 LAB — URATE SERPL-MCNC: 7.6 MG/DL (ref 3.7–8.6)

## 2018-02-20 LAB
ALBUMIN SERPL-MCNC: 4.3 G/DL (ref 3.5–5.5)
ALBUMIN/GLOB SERPL: 1.3 {RATIO} (ref 1.2–2.2)
ALP SERPL-CCNC: 67 IU/L (ref 39–117)
ALT SERPL-CCNC: 17 IU/L (ref 0–44)
AST SERPL-CCNC: 15 IU/L (ref 0–40)
BILIRUB SERPL-MCNC: 0.5 MG/DL (ref 0–1.2)
BUN SERPL-MCNC: 14 MG/DL (ref 6–24)
BUN/CREAT SERPL: 17 (ref 9–20)
CALCIUM SERPL-MCNC: 9.7 MG/DL (ref 8.7–10.2)
CHLORIDE SERPL-SCNC: 99 MMOL/L (ref 96–106)
CHOLEST SERPL-MCNC: 187 MG/DL (ref 100–199)
CO2 SERPL-SCNC: 28 MMOL/L (ref 18–29)
CREAT SERPL-MCNC: 0.83 MG/DL (ref 0.76–1.27)
GLOBULIN SER-MCNC: 3.3 G/DL (ref 1.5–4.5)
GLUCOSE SERPL-MCNC: 106 MG/DL (ref 65–99)
HBA1C MFR BLD: 5.8 % (ref 4.8–5.6)
HDLC SERPL-MCNC: 37 MG/DL
LDLC SERPL CALC-MCNC: 132 MG/DL (ref 0–99)
MICRODELETION SYND BLD/T FISH: NORMAL
POTASSIUM SERPL-SCNC: 4.1 MMOL/L (ref 3.5–5.2)
PROT SERPL-MCNC: 7.6 G/DL (ref 6–8.5)
PSA SERPL-MCNC: 1.1 NG/ML (ref 0–4)
SL AMB EGFR AFRICAN AMERICAN: 125 ML/MIN/1.73
SL AMB EGFR NON AFRICAN AMERICAN: 108 ML/MIN/1.73
SODIUM SERPL-SCNC: 143 MMOL/L (ref 134–144)
TRIGL SERPL-MCNC: 89 MG/DL (ref 0–149)

## 2018-02-21 PROBLEM — R73.01 IFG (IMPAIRED FASTING GLUCOSE): Status: ACTIVE | Noted: 2017-05-26

## 2018-02-21 RX ORDER — RIBOFLAVIN (VITAMIN B2) 100 MG
1 TABLET ORAL DAILY
COMMUNITY
Start: 2016-01-27 | End: 2018-04-19

## 2018-02-21 RX ORDER — VITAMIN E 268 MG
CAPSULE ORAL DAILY
COMMUNITY
End: 2018-04-19

## 2018-02-21 RX ORDER — CHLORAL HYDRATE 500 MG
1 CAPSULE ORAL DAILY
COMMUNITY
End: 2018-09-04 | Stop reason: SDUPTHER

## 2018-02-23 ENCOUNTER — OFFICE VISIT (OUTPATIENT)
Dept: FAMILY MEDICINE CLINIC | Facility: CLINIC | Age: 43
End: 2018-02-23
Payer: COMMERCIAL

## 2018-02-23 VITALS
HEART RATE: 78 BPM | BODY MASS INDEX: 45.1 KG/M2 | TEMPERATURE: 99.4 F | WEIGHT: 315 LBS | SYSTOLIC BLOOD PRESSURE: 126 MMHG | DIASTOLIC BLOOD PRESSURE: 84 MMHG | HEIGHT: 70 IN | RESPIRATION RATE: 18 BRPM

## 2018-02-23 DIAGNOSIS — R73.01 IFG (IMPAIRED FASTING GLUCOSE): ICD-10-CM

## 2018-02-23 DIAGNOSIS — Z00.00 HEALTHCARE MAINTENANCE: Primary | ICD-10-CM

## 2018-02-23 DIAGNOSIS — I10 BENIGN ESSENTIAL HYPERTENSION: ICD-10-CM

## 2018-02-23 PROCEDURE — 99396 PREV VISIT EST AGE 40-64: CPT | Performed by: FAMILY MEDICINE

## 2018-02-23 RX ORDER — PROPRANOLOL HYDROCHLORIDE 160 MG/1
160 CAPSULE, EXTENDED RELEASE ORAL
COMMUNITY
End: 2018-09-04 | Stop reason: SDUPTHER

## 2018-02-23 RX ORDER — MONTELUKAST SODIUM 10 MG/1
10 TABLET ORAL
COMMUNITY
End: 2018-09-04 | Stop reason: SDUPTHER

## 2018-02-23 RX ORDER — RANITIDINE 150 MG/1
TABLET ORAL
COMMUNITY
Start: 2017-09-22 | End: 2018-04-19

## 2018-02-23 RX ORDER — LOSARTAN POTASSIUM AND HYDROCHLOROTHIAZIDE 12.5; 1 MG/1; MG/1
1 TABLET ORAL
COMMUNITY
End: 2018-04-19

## 2018-02-23 RX ORDER — DILTIAZEM HYDROCHLORIDE 120 MG/1
120 TABLET, FILM COATED ORAL
COMMUNITY
End: 2018-09-04 | Stop reason: SDUPTHER

## 2018-02-23 NOTE — PROGRESS NOTES
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          The following portions of the patient's history were reviewed and updated as appropriate: allergies, current medications, past family history, past medical history, past social history, past surgical history and problem list     Review of Systems   Respiratory: Negative for shortness of breath  Cardiovascular: Negative for chest pain  Current Outpatient Prescriptions   Medication Sig Dispense Refill    albuterol (PROVENTIL HFA) 90 mcg/act inhaler Inhale 2 puffs every 4 (four) hours as needed      Ascorbic Acid (VITAMIN C) 100 MG tablet Take 1 tablet by mouth daily      cetirizine (ZyrTEC) 10 mg tablet Take 1 tablet by mouth daily      diltiazem (CARDIZEM) 120 MG tablet Take 120 mg by mouth daily   losartan-hydrochlorothiazide (HYZAAR) 100-12 5 MG per tablet Take 1 tablet by mouth daily   montelukast (SINGULAIR) 10 mg tablet Take 10 mg by mouth daily   Omega-3 1400 MG CAPS Take 1 capsule by mouth daily   propranolol (INDERAL LA) 160 mg Take 1 capsule by mouth daily      ranitidine (ZANTAC) 150 mg tablet Take 150 mg by mouth 2 (two) times a day  0    vitamin E, tocopherol, 400 units capsule Take 400 Units by mouth daily   Ascorbic Acid (VITAMIN C) 1000 MG tablet Take 1,000 mg by mouth daily   diltiazem (CARDIZEM CD) 120 mg 24 hr capsule Take 1 capsule by mouth daily      levocetirizine (XYZAL) 5 MG tablet Take 5 mg by mouth daily        losartan-hydrochlorothiazide (HYZAAR) 100-12 5 MG per tablet Take 1 tablet by mouth daily      montelukast (SINGULAIR) 10 mg tablet Take 1 tablet by mouth daily      Omega-3 Fatty Acids (OMEGA-3 FISH OIL) 1000 MG CAPS Take 1 capsule by mouth daily      omeprazole (PriLOSEC) 40 MG capsule Take 40 mg by mouth daily   ranitidine (ZANTAC) 150 MG capsule Take 1 capsule by mouth 2 (two) times a day      UNABLE TO FIND Take 160 mg by mouth daily  Propanolol      vitamin E, tocopherol, 400 units capsule Take by mouth daily       No current facility-administered medications for this visit  Objective:    /84   Pulse 78   Temp 99 4 °F (37 4 °C)   Resp 18   Ht 5' 10" (1 778 m)   Wt (!) 171 kg (378 lb)   BMI 54 24 kg/m²        Physical Exam   Constitutional: He appears well-developed  HENT:   Head: Normocephalic  Eyes: Conjunctivae are normal    Neck: Neck supple  Cardiovascular: Normal rate and intact distal pulses  Pulmonary/Chest: Effort normal  No respiratory distress  Abdominal: Soft  He exhibits distension  There is no tenderness  Musculoskeletal: He exhibits no edema or deformity  Neurological: He is alert  Skin: Skin is warm and dry  Psychiatric: His behavior is normal  Thought content normal    Nursing note and vitals reviewed           ALONZO/prostate/testicle normal    Merrill Dorman DO

## 2018-04-04 DIAGNOSIS — G47.33 OBSTRUCTIVE SLEEP APNEA: Primary | ICD-10-CM

## 2018-04-10 DIAGNOSIS — I10 BENIGN ESSENTIAL HYPERTENSION: ICD-10-CM

## 2018-04-10 DIAGNOSIS — J30.9 ALLERGIC RHINITIS, UNSPECIFIED SEASONALITY, UNSPECIFIED TRIGGER: Primary | ICD-10-CM

## 2018-04-10 RX ORDER — MONTELUKAST SODIUM 10 MG/1
TABLET ORAL
Qty: 90 TABLET | Refills: 3 | Status: SHIPPED | OUTPATIENT
Start: 2018-04-10 | End: 2018-09-04 | Stop reason: SDUPTHER

## 2018-04-10 RX ORDER — LOSARTAN POTASSIUM AND HYDROCHLOROTHIAZIDE 12.5; 1 MG/1; MG/1
TABLET ORAL
Qty: 90 TABLET | Refills: 3 | Status: SHIPPED | OUTPATIENT
Start: 2018-04-10 | End: 2019-03-25 | Stop reason: SDUPTHER

## 2018-04-10 RX ORDER — PROPRANOLOL HYDROCHLORIDE 160 MG/1
CAPSULE, EXTENDED RELEASE ORAL
Qty: 90 CAPSULE | Refills: 3 | Status: SHIPPED | OUTPATIENT
Start: 2018-04-10 | End: 2018-04-19

## 2018-04-10 RX ORDER — DILTIAZEM HYDROCHLORIDE 120 MG/1
CAPSULE, COATED, EXTENDED RELEASE ORAL
Qty: 90 CAPSULE | Refills: 3 | Status: SHIPPED | OUTPATIENT
Start: 2018-04-10 | End: 2019-03-25 | Stop reason: SDUPTHER

## 2018-05-30 ENCOUNTER — OFFICE VISIT (OUTPATIENT)
Dept: PULMONOLOGY | Facility: MEDICAL CENTER | Age: 43
End: 2018-05-30
Payer: COMMERCIAL

## 2018-05-30 VITALS
RESPIRATION RATE: 18 BRPM | WEIGHT: 315 LBS | SYSTOLIC BLOOD PRESSURE: 122 MMHG | HEART RATE: 74 BPM | TEMPERATURE: 99.2 F | HEIGHT: 69 IN | DIASTOLIC BLOOD PRESSURE: 64 MMHG | OXYGEN SATURATION: 97 % | BODY MASS INDEX: 46.65 KG/M2

## 2018-05-30 DIAGNOSIS — G47.33 OBSTRUCTIVE SLEEP APNEA: ICD-10-CM

## 2018-05-30 DIAGNOSIS — E66.01 MORBID OBESITY (HCC): Primary | ICD-10-CM

## 2018-05-30 PROCEDURE — 99204 OFFICE O/P NEW MOD 45 MIN: CPT | Performed by: NURSE PRACTITIONER

## 2018-05-30 NOTE — PROGRESS NOTES
Assessment/Plan:       Problem List Items Addressed This Visit        Respiratory    Obstructive sleep apnea     [place] has a history of severe obstructive sleep apnea  I was able to hit review initial diagnostic test done in 2003  Since that time he is using a CPAP with 12 cm of water pressure  Compliance data is reviewed for the last 30 days  [place] has excellent compliance average usage is 7 hr and 25 min  Most importantly apneic hypopnea index is reduced to 2 1  He is compliant in the use of CPAP for his previous history of severe obstructive sleep apnea  Plan includes continuation of the same  Other    Morbid obesity ([address] ) - Primary     [place] has considered weight loss often  He has morbid obesity that likely contributes to his severe obstructive sleep apnea  Currently he has SAGAR is being treated adequately and he is compliant  Weight loss will be continued with his primary care physician  No Follow-up on file  All questions are answered to the patient's satisfaction and understanding  He verbalizes understanding  He is encouraged to call with any further questions or concerns  Portions of the record may have been created with voice recognition software  Occasional wrong word or "sound a like" substitutions may have occurred due to the inherent limitations of voice recognition software  Read the chart carefully and recognize, using context, where substitutions have occurred  ______________________________________________________________________    Chief Complaint:   Chief Complaint   Patient presents with    Sleep Apnea        Patient ID: [place] is a 39 y o  y o  male has a past medical history of Acid reflux; Cellulitis; Hypertension; Loose body in knee; Neoplasm of bone (01/28/2008); Patellar tendonitis; Seasonal allergies; Seborrheic dermatitis (01/20/2010); Sleep apnea; and Sprain and strain      5/30/2018  [place] is a 62-year-old male with history of severe obstructive sleep apnea  In March 2003 he had a diagnostic study that showed apneic hypopnea index of 112 events  Lowest oxygen was 73%  According to patient he has had several titration studies done since 2003  His last known CPAP pressure is 12 cm of water pressure  He has followed in the past with Dr Sarah 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  He denies any daytime hypersomnolence nor does he snore with current CPAP machine  Dione Solomon also has a history of benign hypertension and morbid obesity  Cough   This is a chronic problem  The current episode started more than 1 year ago  The problem has been unchanged  The problem occurs every few hours  The cough is non-productive  The symptoms are aggravated by pollens  He has tried a beta-agonist inhaler for the symptoms  The treatment provided mild relief  His past medical history is significant for environmental allergies  Occupational/Exposure history:  Travel history:  Review of Systems   Constitutional: Negative  HENT: Negative  Eyes: Negative  Respiratory: Positive for cough  Cardiovascular: Negative  Gastrointestinal: Negative  Endocrine: Negative  Genitourinary: Negative  Musculoskeletal: Negative  Skin: Negative  Allergic/Immunologic: Positive for environmental allergies  Neurological: Negative  Hematological: Negative  Psychiatric/Behavioral: Negative  Social history: He reports that he has never smoked  He has never used smokeless tobacco  He reports that he does not drink alcohol or use drugs      Past surgical history:   Past Surgical History:   Procedure Laterality Date    CHOLECYSTECTOMY      FINGER FRACTURE SURGERY Left 1996    pinky    KNEE ARTHROSCOPY Bilateral     left knee growth removed    KNEE SURGERY Right 1992    LIPOMA RESECTION      left side    ID EGD TRANSORAL BIOPSY SINGLE/MULTIPLE N/A 2/22/2016    Procedure: ESOPHAGOGASTRODUODENOSCOPY (EGD); Surgeon: Mani Lyles MD;  Location: [facility]; Service: Gastroenterology     Family history:   Family History   Problem Relation Age of Onset    Cancer Maternal Grandmother     Breast cancer Mother     Atrial fibrillation Father     Sleep apnea Father     Arthritis Family     Breast cancer Family     Stomach cancer Family     Liver cancer Family        Immunization History   Administered Date(s) Administered    Influenza TIV (IM) 10/11/2005, 11/02/2006, 10/17/2007, 10/28/2008, 01/20/2010, 10/01/2015    Td (adult), adsorbed 10/11/2005    Tdap 11/15/2016     Current Outpatient Prescriptions   Medication Sig Dispense Refill    albuterol (PROVENTIL HFA) 90 mcg/act inhaler Inhale 2 puffs every 4 (four) hours as needed      Ascorbic Acid (VITAMIN C) 1000 MG tablet Take 1,000 mg by mouth      cetirizine (ZyrTEC) 10 mg tablet Take 1 tablet by mouth daily      diltiazem (CARDIZEM CD) 120 mg 24 hr capsule TAKE 1 CAPSULE DAILY 90 capsule 3    losartan-hydrochlorothiazide (HYZAAR) 100-12 5 MG per tablet TAKE 1 TABLET DAILY 90 tablet 3    montelukast (SINGULAIR) 10 mg tablet Take 10 mg by mouth daily   Omega-3 1400 MG CAPS Take 1 capsule by mouth daily   propranolol (INDERAL LA) 160 mg Take 1 capsule by mouth daily      ranitidine (ZANTAC) 150 mg tablet Take 150 mg by mouth 2 (two) times a day  0    vitamin E, tocopherol, 400 units capsule Take 400 Units by mouth daily   diltiazem (CARDIZEM) 120 MG tablet Take 120 mg by mouth      DOCOSAHEXAENOIC ACID PO Take by mouth      levocetirizine (XYZAL) 5 MG tablet Take 5 mg by mouth daily   montelukast (SINGULAIR) 10 mg tablet Take 10 mg by mouth      montelukast (SINGULAIR) 10 mg tablet TAKE 1 TABLET DAILY 90 tablet 3    Omega-3 Fatty Acids (OMEGA-3 FISH OIL) 1000 MG CAPS Take 1 capsule by mouth daily      omeprazole (PriLOSEC) 40 MG capsule Take 40 mg by mouth daily        propranolol (INDERAL LA) 160 mg Take 160 mg by mouth      vitamin E, tocopherol, 400 units capsule Take 400 Units by mouth       No current facility-administered medications for this visit  Allergies: Seasonal ic [cholestatin]    Objective:  Vitals:    05/30/18 1428   BP: 122/64   BP Location: Left arm   Patient Position: Sitting   Cuff Size: Standard   Pulse: 74   Resp: 18   Temp: 99 2 °F (37 3 °C)   TempSrc: Tympanic   SpO2: 97%   Weight: (!) 192 kg (423 lb)   Height: 5' 9" (1 753 m)   Oxygen Therapy  SpO2: 97 %    Wt Readings from Last 3 Encounters:   05/30/18 (!) 192 kg (423 lb)   02/23/18 (!) 171 kg (378 lb)   12/01/17 (!) 171 kg (377 lb 15 9 oz)     Body mass index is 62 47 kg/m²  Physical Exam   Constitutional: He is oriented to person, place, and time  He appears well-developed and well-nourished  HENT:   Head: Normocephalic and atraumatic  Mallampati 4   Eyes: Pupils are equal, round, and reactive to light  Neck: Normal range of motion  Neck supple  Cardiovascular: Normal rate and regular rhythm  Pulmonary/Chest: Effort normal and breath sounds normal    Abdominal: Soft  Musculoskeletal: Normal range of motion  Neurological: He is alert and oriented to person, place, and time  He has normal reflexes  Skin: Skin is warm and dry  Psychiatric: He has a normal mood and affect   His behavior is normal  Thought content normal        Lab Review:   Orders Only on 02/19/2018   Component Date Value    SL AMB GLUCOSE 02/19/2018 106*    BUN 02/19/2018 14     Creatinine, Serum 02/19/2018 0 83     eGFR Non  02/19/2018 108     SL AMB EGFR  AMER* 02/19/2018 125     SL AMB BUN/CREATININE RA* 02/19/2018 17     SL AMB SODIUM 02/19/2018 143     SL AMB POTASSIUM 02/19/2018 4 1     SL AMB CHLORIDE 02/19/2018 99     SL AMB CARBON DIOXIDE 02/19/2018 28     CALCIUM 02/19/2018 9 7     SL AMB PROTEIN, TOTAL 02/19/2018 7 6     Serum Albumin 02/19/2018 4 3     Globulin, Total 02/19/2018 3 3     SL AMB ALBUMIN/GLOBULIN * 02/19/2018 1 3     SL AMB BILIRUBIN, TOTAL 02/19/2018 0 5     Alk Phos Isoenzymes 02/19/2018 67     SL AMB AST 02/19/2018 15     SL AMB ALT 02/19/2018 17     Cholesterol, Total 02/19/2018 187     Triglycerides 02/19/2018 89     SL AMB HDL CHOLESTEROL 02/19/2018 37*    SL AMB LDL-CHOLESTEROL 02/19/2018 132*    Hemoglobin A1C 02/19/2018 5 8*    Prostate Specific Antige* 02/19/2018 1 1     SL AMB INTERPRETATION 02/19/2018 Note    Orders Only on 02/19/2018   Component Date Value    Uric Acid 02/19/2018 7 6        Diagnostics:  I have personally reviewed pertinent reports  Office Spirometry Results:     ESS:    No results found

## 2018-05-30 NOTE — PATIENT INSTRUCTIONS
U of history of severe obstructive sleep apnea  Here currently using CPAP at 12 cm of water pressure  Last 30 days shows excellent compliance  Apneic hypopnea index is reduced to 2 1 events per hour  This is normal   Your using an benefitting from the therapy of CPAP for year diagnosis of severe obstructive sleep apnea

## 2018-05-30 NOTE — ASSESSMENT & PLAN NOTE
Yoana Banks has a history of severe obstructive sleep apnea  I was able to hit review initial diagnostic test done in 2003  Since that time he is using a CPAP with 12 cm of water pressure  Compliance data is reviewed for the last 30 days  Yoana Banks has excellent compliance average usage is 7 hr and 25 min  Most importantly apneic hypopnea index is reduced to 2 1  He is compliant in the use of CPAP for his previous history of severe obstructive sleep apnea  Plan includes continuation of the same

## 2018-05-30 NOTE — ASSESSMENT & PLAN NOTE
Agatha Peraza has considered weight loss often  He has morbid obesity that likely contributes to his severe obstructive sleep apnea  Currently he has SAGAR is being treated adequately and he is compliant  Weight loss will be continued with his primary care physician

## 2018-08-27 LAB
ALBUMIN SERPL-MCNC: 4.4 G/DL (ref 3.5–5.5)
ALBUMIN/GLOB SERPL: 1.3 {RATIO} (ref 1.2–2.2)
ALP SERPL-CCNC: 76 IU/L (ref 39–117)
ALT SERPL-CCNC: 21 IU/L (ref 0–44)
AMBIG ABBREV DEFAULT: NORMAL
AST SERPL-CCNC: 18 IU/L (ref 0–40)
BILIRUB SERPL-MCNC: 0.5 MG/DL (ref 0–1.2)
BUN SERPL-MCNC: 14 MG/DL (ref 6–24)
BUN/CREAT SERPL: 15 (ref 9–20)
CALCIUM SERPL-MCNC: 10.1 MG/DL (ref 8.7–10.2)
CHLORIDE SERPL-SCNC: 98 MMOL/L (ref 96–106)
CO2 SERPL-SCNC: 27 MMOL/L (ref 20–29)
CREAT SERPL-MCNC: 0.95 MG/DL (ref 0.76–1.27)
GLOBULIN SER-MCNC: 3.5 G/DL (ref 1.5–4.5)
GLUCOSE SERPL-MCNC: 100 MG/DL (ref 65–99)
HBA1C MFR BLD: 5.8 % (ref 4.8–5.6)
POTASSIUM SERPL-SCNC: 4.6 MMOL/L (ref 3.5–5.2)
PROT SERPL-MCNC: 7.9 G/DL (ref 6–8.5)
SL AMB EGFR AFRICAN AMERICAN: 112 ML/MIN/1.73
SL AMB EGFR NON AFRICAN AMERICAN: 97 ML/MIN/1.73
SODIUM SERPL-SCNC: 144 MMOL/L (ref 134–144)

## 2018-09-04 ENCOUNTER — OFFICE VISIT (OUTPATIENT)
Dept: FAMILY MEDICINE CLINIC | Facility: CLINIC | Age: 43
End: 2018-09-04
Payer: COMMERCIAL

## 2018-09-04 VITALS
HEART RATE: 84 BPM | HEIGHT: 69 IN | TEMPERATURE: 99 F | RESPIRATION RATE: 16 BRPM | SYSTOLIC BLOOD PRESSURE: 124 MMHG | BODY MASS INDEX: 46.65 KG/M2 | WEIGHT: 315 LBS | DIASTOLIC BLOOD PRESSURE: 76 MMHG

## 2018-09-04 DIAGNOSIS — E66.01 MORBID OBESITY (HCC): ICD-10-CM

## 2018-09-04 DIAGNOSIS — I10 BENIGN ESSENTIAL HYPERTENSION: Primary | ICD-10-CM

## 2018-09-04 DIAGNOSIS — Z13.89 SCREENING FOR CARDIOVASCULAR, RESPIRATORY, AND GENITOURINARY DISEASES: ICD-10-CM

## 2018-09-04 DIAGNOSIS — Z91.89 FRAMINGHAM CARDIAC RISK <10% IN NEXT 10 YEARS: ICD-10-CM

## 2018-09-04 DIAGNOSIS — K21.00 CHRONIC REFLUX ESOPHAGITIS: ICD-10-CM

## 2018-09-04 DIAGNOSIS — Z13.83 SCREENING FOR CARDIOVASCULAR, RESPIRATORY, AND GENITOURINARY DISEASES: ICD-10-CM

## 2018-09-04 DIAGNOSIS — Z13.6 SCREENING FOR CARDIOVASCULAR, RESPIRATORY, AND GENITOURINARY DISEASES: ICD-10-CM

## 2018-09-04 DIAGNOSIS — Z12.5 PROSTATE CANCER SCREENING: ICD-10-CM

## 2018-09-04 DIAGNOSIS — M1A.9XX0 CHRONIC GOUT WITHOUT TOPHUS, UNSPECIFIED CAUSE, UNSPECIFIED SITE: ICD-10-CM

## 2018-09-04 DIAGNOSIS — R73.01 IFG (IMPAIRED FASTING GLUCOSE): ICD-10-CM

## 2018-09-04 PROCEDURE — 1036F TOBACCO NON-USER: CPT | Performed by: FAMILY MEDICINE

## 2018-09-04 PROCEDURE — 3078F DIAST BP <80 MM HG: CPT | Performed by: FAMILY MEDICINE

## 2018-09-04 PROCEDURE — 3008F BODY MASS INDEX DOCD: CPT | Performed by: FAMILY MEDICINE

## 2018-09-04 PROCEDURE — 99214 OFFICE O/P EST MOD 30 MIN: CPT | Performed by: FAMILY MEDICINE

## 2018-09-04 PROCEDURE — 3074F SYST BP LT 130 MM HG: CPT | Performed by: FAMILY MEDICINE

## 2018-09-04 NOTE — PROGRESS NOTES
Assessment/Plan:    No problem-specific Assessment & Plan notes found for this encounter  gerd mostly controlled, yuan with diet and avoiding coffee  htn stable  Zantac helps, PPI risks advised  Has seen Dr Peyman Lora  Gout stable  cpe Feb planned, labs given  Indocin? Prn for gout, call for refills prn  bmi advised   ifg results aware     Diagnoses and all orders for this visit:    Benign essential hypertension    [place] cardiac risk <10% in next 10 years  -     Lipid Panel with Direct LDL reflex; Future    Chronic gout without tophus, unspecified cause, unspecified site  -     Uric acid; Future    Chronic reflux esophagitis    Morbid obesity (HCC)    BMI 50 0-59 9, adult [facility])    Prostate cancer screening  -     PSA, Total Screen; Future    Screening for cardiovascular, respiratory, and genitourinary diseases  -     Lipid Panel with Direct LDL reflex; Future    IFG (impaired fasting glucose)  -     Comprehensive metabolic panel; Future  -     Hemoglobin A1C; Future              Return in about 6 months (around 3/4/2019) for Annual physical     Subjective:      Patient ID: Radha Liu is a 39 y o  male  Chief Complaint   Patient presents with    Follow-up     review new labs-lj    Hypertension       HPI  CDL qNov, 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  Taking all meds  Some wt loss  Eating better  Father is a diabetic  Limiting carbs  Gout attacks rare, over 1yr  Has seen GI, Dr Peyman Lora    The following portions of the patient's history were reviewed and updated as appropriate: allergies, current medications, past family history, past medical history, past social history, past surgical history and problem list     Review of Systems   Respiratory: Negative for shortness of breath  Cardiovascular: Negative for chest pain  Neurological: Negative for dizziness           Current Outpatient Prescriptions   Medication Sig Dispense Refill    albuterol (PROVENTIL HFA) 90 mcg/act inhaler Inhale 2 puffs every 4 (four) hours as needed      Ascorbic Acid (VITAMIN C) 1000 MG tablet Take 1,000 mg by mouth      cetirizine (ZyrTEC) 10 mg tablet Take 1 tablet by mouth daily      diltiazem (CARDIZEM CD) 120 mg 24 hr capsule TAKE 1 CAPSULE DAILY 90 capsule 3    levocetirizine (XYZAL) 5 MG tablet Take 5 mg by mouth daily   losartan-hydrochlorothiazide (HYZAAR) 100-12 5 MG per tablet TAKE 1 TABLET DAILY 90 tablet 3    montelukast (SINGULAIR) 10 mg tablet Take 10 mg by mouth daily   Omega-3 1400 MG CAPS Take 1 capsule by mouth daily   propranolol (INDERAL LA) 160 mg Take 1 capsule by mouth daily      ranitidine (ZANTAC) 150 mg tablet Take 150 mg by mouth 2 (two) times a day  0    vitamin E, tocopherol, 400 units capsule Take 400 Units by mouth daily  No current facility-administered medications for this visit  Objective:    /76   Pulse 84   Temp 99 °F (37 2 °C)   Resp 16   Ht 5' 9" (1 753 m)   Wt (!) 169 kg (372 lb)   BMI 54 93 kg/m²        Physical Exam   Constitutional: He appears well-developed  No distress  HENT:   Head: Normocephalic  Mouth/Throat: No oropharyngeal exudate  Eyes: Conjunctivae are normal  No scleral icterus  Neck: Neck supple  Cardiovascular: Normal rate and intact distal pulses  No murmur heard  Pulmonary/Chest: Effort normal  No respiratory distress  Abdominal: Soft  He exhibits distension  Musculoskeletal: He exhibits edema  He exhibits no tenderness or deformity  Mild pretibial edema   Neurological: He is alert  Skin: Skin is warm and dry  No rash noted  Psychiatric: His behavior is normal  Thought content normal    Nursing note and vitals reviewed               Amna Soria DO

## 2018-10-22 ENCOUNTER — TELEPHONE (OUTPATIENT)
Dept: PULMONOLOGY | Facility: MEDICAL CENTER | Age: 43
End: 2018-10-22

## 2018-12-10 ENCOUNTER — OFFICE VISIT (OUTPATIENT)
Dept: FAMILY MEDICINE CLINIC | Facility: CLINIC | Age: 43
End: 2018-12-10
Payer: COMMERCIAL

## 2018-12-10 VITALS
RESPIRATION RATE: 16 BRPM | BODY MASS INDEX: 46.65 KG/M2 | HEIGHT: 69 IN | DIASTOLIC BLOOD PRESSURE: 72 MMHG | TEMPERATURE: 99.3 F | SYSTOLIC BLOOD PRESSURE: 118 MMHG | WEIGHT: 315 LBS | HEART RATE: 72 BPM

## 2018-12-10 DIAGNOSIS — J30.9 ALLERGIC RHINITIS, UNSPECIFIED SEASONALITY, UNSPECIFIED TRIGGER: ICD-10-CM

## 2018-12-10 DIAGNOSIS — E66.01 MORBID OBESITY (HCC): ICD-10-CM

## 2018-12-10 DIAGNOSIS — J01.00 ACUTE NON-RECURRENT MAXILLARY SINUSITIS: Primary | ICD-10-CM

## 2018-12-10 DIAGNOSIS — I10 BENIGN ESSENTIAL HYPERTENSION: ICD-10-CM

## 2018-12-10 PROBLEM — E66.813 CLASS 3 SEVERE OBESITY DUE TO EXCESS CALORIES WITH SERIOUS COMORBIDITY AND BODY MASS INDEX (BMI) OF 50.0 TO 59.9 IN ADULT (HCC): Status: ACTIVE | Noted: 2018-11-12

## 2018-12-10 PROCEDURE — 1036F TOBACCO NON-USER: CPT | Performed by: FAMILY MEDICINE

## 2018-12-10 PROCEDURE — 99214 OFFICE O/P EST MOD 30 MIN: CPT | Performed by: FAMILY MEDICINE

## 2018-12-10 PROCEDURE — 3078F DIAST BP <80 MM HG: CPT | Performed by: FAMILY MEDICINE

## 2018-12-10 PROCEDURE — 3074F SYST BP LT 130 MM HG: CPT | Performed by: FAMILY MEDICINE

## 2018-12-10 PROCEDURE — 3008F BODY MASS INDEX DOCD: CPT | Performed by: FAMILY MEDICINE

## 2018-12-10 RX ORDER — AMOXICILLIN AND CLAVULANATE POTASSIUM 875; 125 MG/1; MG/1
1 TABLET, FILM COATED ORAL 2 TIMES DAILY
Qty: 20 TABLET | Refills: 0 | Status: SHIPPED | OUTPATIENT
Start: 2018-12-10 | End: 2018-12-20

## 2018-12-10 NOTE — PROGRESS NOTES
Assessment/Plan:    No problem-specific Assessment & Plan notes found for this encounter  Sinusitis new  htn stable  bmi aware  Allergies stable  Diet/exercise/weight loss is recommended  Diagnoses and all orders for this visit:    Acute non-recurrent maxillary sinusitis  -     amoxicillin-clavulanate (AUGMENTIN) 875-125 mg per tablet; Take 1 tablet by mouth 2 (two) times a day for 10 days    Benign essential hypertension    BMI 50 0-59 9, adult (HCC)    Morbid obesity (HCC)    Allergic rhinitis, unspecified seasonality, unspecified trigger              No Follow-up on file  Subjective:      Patient ID: Wilber Coronado is a 39 y o  male  Chief Complaint   Patient presents with    Fatigue     ears pop, head and chest congestion       HPI  Congestion  Ears clogged  Sinus pressure  Fatigue  achey  Fever last pm  100 at home temp  No mucus seen but chest congestion  otc used  6 days  Worse in past 2d  No nasal dc yellow,currently clear  CDL    The following portions of the patient's history were reviewed and updated as appropriate: allergies, current medications, past family history, past medical history, past social history, past surgical history and problem list     Review of Systems   Respiratory: Negative for shortness of breath and wheezing  Neurological: Negative for dizziness  Current Outpatient Prescriptions   Medication Sig Dispense Refill    albuterol (PROVENTIL HFA) 90 mcg/act inhaler Inhale 2 puffs every 4 (four) hours as needed      Ascorbic Acid (VITAMIN C) 1000 MG tablet Take 1,000 mg by mouth      diltiazem (CARDIZEM CD) 120 mg 24 hr capsule TAKE 1 CAPSULE DAILY 90 capsule 3    levocetirizine (XYZAL) 5 MG tablet Take 5 mg by mouth daily   losartan-hydrochlorothiazide (HYZAAR) 100-12 5 MG per tablet TAKE 1 TABLET DAILY 90 tablet 3    montelukast (SINGULAIR) 10 mg tablet Take 10 mg by mouth daily   Omega-3 1400 MG CAPS Take 1 capsule by mouth daily        propranolol (INDERAL LA) 160 mg Take 1 capsule by mouth daily      ranitidine (ZANTAC) 150 mg tablet Take 150 mg by mouth 2 (two) times a day  0    vitamin E, tocopherol, 400 units capsule Take 400 Units by mouth daily   amoxicillin-clavulanate (AUGMENTIN) 875-125 mg per tablet Take 1 tablet by mouth 2 (two) times a day for 10 days 20 tablet 0    cetirizine (ZyrTEC) 10 mg tablet Take 1 tablet by mouth daily       No current facility-administered medications for this visit  Objective:    /72   Pulse 72   Temp 99 3 °F (37 4 °C)   Resp 16   Ht 5' 9" (1 753 m)   Wt (!) 170 kg (374 lb)   BMI 55 23 kg/m²        Physical Exam   Constitutional: He appears well-developed  No distress  HENT:   Head: Normocephalic  Mouth/Throat: No oropharyngeal exudate  Sinuses tender to percussion, nasal turbinates visualized and appear red and swollen     Eyes: Conjunctivae are normal    Neck: Neck supple  Cardiovascular: Normal rate and intact distal pulses  No murmur heard  Pulmonary/Chest: Effort normal  No respiratory distress  He has no wheezes  Abdominal: Soft  There is no tenderness  Musculoskeletal: He exhibits no edema or deformity  Lymphadenopathy:     He has no cervical adenopathy  Neurological: He is alert  Skin: Skin is warm and dry  No rash noted  No pallor  Psychiatric: His behavior is normal  Thought content normal    Nursing note and vitals reviewed               Johnathan Goyal DO

## 2019-02-05 LAB
ALBUMIN SERPL-MCNC: 4.1 G/DL (ref 3.6–5.1)
ALBUMIN/GLOB SERPL: 1.2 (CALC) (ref 1–2.5)
ALP SERPL-CCNC: 62 U/L (ref 40–115)
ALT SERPL-CCNC: 19 U/L (ref 9–46)
AST SERPL-CCNC: 15 U/L (ref 10–40)
BILIRUB SERPL-MCNC: 0.7 MG/DL (ref 0.2–1.2)
BUN SERPL-MCNC: 12 MG/DL (ref 7–25)
BUN/CREAT SERPL: ABNORMAL (CALC) (ref 6–22)
CALCIUM SERPL-MCNC: 9.6 MG/DL (ref 8.6–10.3)
CHLORIDE SERPL-SCNC: 103 MMOL/L (ref 98–110)
CHOLEST SERPL-MCNC: 207 MG/DL
CHOLEST/HDLC SERPL: 4.6 (CALC)
CO2 SERPL-SCNC: 34 MMOL/L (ref 20–32)
CREAT SERPL-MCNC: 0.83 MG/DL (ref 0.6–1.35)
GLOBULIN SER CALC-MCNC: 3.4 G/DL (CALC) (ref 1.9–3.7)
GLUCOSE SERPL-MCNC: 116 MG/DL (ref 65–99)
HBA1C MFR BLD: 6 % OF TOTAL HGB
HDLC SERPL-MCNC: 45 MG/DL
LDLC SERPL CALC-MCNC: 142 MG/DL (CALC)
NONHDLC SERPL-MCNC: 162 MG/DL (CALC)
POTASSIUM SERPL-SCNC: 4.4 MMOL/L (ref 3.5–5.3)
PROT SERPL-MCNC: 7.5 G/DL (ref 6.1–8.1)
PSA SERPL-MCNC: 0.9 NG/ML
SL AMB EGFR AFRICAN AMERICAN: 124 ML/MIN/1.73M2
SL AMB EGFR NON AFRICAN AMERICAN: 107 ML/MIN/1.73M2
SODIUM SERPL-SCNC: 143 MMOL/L (ref 135–146)
TRIGL SERPL-MCNC: 91 MG/DL
URATE SERPL-MCNC: 7.8 MG/DL (ref 4–8)

## 2019-02-06 ENCOUNTER — OFFICE VISIT (OUTPATIENT)
Dept: FAMILY MEDICINE CLINIC | Facility: CLINIC | Age: 44
End: 2019-02-06
Payer: COMMERCIAL

## 2019-02-06 ENCOUNTER — TELEPHONE (OUTPATIENT)
Dept: FAMILY MEDICINE CLINIC | Facility: CLINIC | Age: 44
End: 2019-02-06

## 2019-02-06 VITALS
WEIGHT: 315 LBS | SYSTOLIC BLOOD PRESSURE: 126 MMHG | HEART RATE: 80 BPM | BODY MASS INDEX: 46.65 KG/M2 | HEIGHT: 69 IN | DIASTOLIC BLOOD PRESSURE: 80 MMHG | RESPIRATION RATE: 20 BRPM | TEMPERATURE: 97.6 F

## 2019-02-06 DIAGNOSIS — Z91.89 FRAMINGHAM CARDIAC RISK <10% IN NEXT 10 YEARS: ICD-10-CM

## 2019-02-06 DIAGNOSIS — E66.01 MORBID OBESITY (HCC): ICD-10-CM

## 2019-02-06 DIAGNOSIS — I10 BENIGN ESSENTIAL HYPERTENSION: Primary | ICD-10-CM

## 2019-02-06 DIAGNOSIS — J01.00 ACUTE NON-RECURRENT MAXILLARY SINUSITIS: ICD-10-CM

## 2019-02-06 DIAGNOSIS — J30.9 ALLERGIC RHINITIS, UNSPECIFIED SEASONALITY, UNSPECIFIED TRIGGER: ICD-10-CM

## 2019-02-06 PROCEDURE — 3079F DIAST BP 80-89 MM HG: CPT | Performed by: FAMILY MEDICINE

## 2019-02-06 PROCEDURE — 3074F SYST BP LT 130 MM HG: CPT | Performed by: FAMILY MEDICINE

## 2019-02-06 PROCEDURE — 3008F BODY MASS INDEX DOCD: CPT | Performed by: FAMILY MEDICINE

## 2019-02-06 PROCEDURE — 99214 OFFICE O/P EST MOD 30 MIN: CPT | Performed by: FAMILY MEDICINE

## 2019-02-06 PROCEDURE — 1036F TOBACCO NON-USER: CPT | Performed by: FAMILY MEDICINE

## 2019-02-06 RX ORDER — AMOXICILLIN AND CLAVULANATE POTASSIUM 875; 125 MG/1; MG/1
1 TABLET, FILM COATED ORAL 2 TIMES DAILY
Qty: 20 TABLET | Refills: 0 | Status: SHIPPED | OUTPATIENT
Start: 2019-02-06 | End: 2019-02-07 | Stop reason: SDUPTHER

## 2019-02-06 NOTE — PROGRESS NOTES
Assessment/Plan:    No problem-specific Assessment & Plan notes found for this encounter  EarlySinusitis new, mucinex dm bid, abx if no better  htn stable  bmi aware  Allergies unchanged     Diagnoses and all orders for this visit:    Benign essential hypertension    Morbid obesity (HCC)    BMI 50 0-59 9, adult (HCC)    Acute non-recurrent maxillary sinusitis  -     amoxicillin-clavulanate (AUGMENTIN) 875-125 mg per tablet; Take 1 tablet by mouth 2 (two) times a day for 10 days    [place] cardiac risk <10% in next 10 years    Allergic rhinitis, unspecified seasonality, unspecified trigger      BMI Counseling: Body mass index is 55 08 kg/m²  Discussed the patient's BMI with him  The BMI is above average  BMI counseling and education was provided to the patient  Nutrition recommendations include decreasing overall calorie intake  Exercise recommendations include exercising 3-5 times per week  No Follow-up on file  Subjective:      Patient ID: Leticia Mercer is a 39 y o  male  Chief Complaint   Patient presents with    Cough     harsh non productive    Generalized Body Aches    Headache    Fatigue    Fever     symptoms for the past 4 days  [id number]       HPI  On low carb diet, more fruits/vegetables    Cough, congestion, fatigue, fever, wife was sick  Feverish, chills  np cough  Nasal christa and drip, clear so far  About 4d  On wt loss plan from wife's work  Feeling like its working  Just started  Limiting portions and carbs    Takes bp meds  No cough or cp or dizziness    The following portions of the patient's history were reviewed and updated as appropriate: allergies, current medications, past family history, past medical history, past social history, past surgical history and problem list     Review of Systems   Respiratory: Negative for shortness of breath and wheezing            Current Outpatient Prescriptions   Medication Sig Dispense Refill    albuterol (PROVENTIL HFA) 90 mcg/act inhaler Inhale 2 puffs every 4 (four) hours as needed      Ascorbic Acid (VITAMIN C) 1000 MG tablet Take 1,000 mg by mouth      cetirizine (ZyrTEC) 10 mg tablet Take 1 tablet by mouth daily      diltiazem (CARDIZEM CD) 120 mg 24 hr capsule TAKE 1 CAPSULE DAILY 90 capsule 3    losartan-hydrochlorothiazide (HYZAAR) 100-12 5 MG per tablet TAKE 1 TABLET DAILY 90 tablet 3    montelukast (SINGULAIR) 10 mg tablet Take 10 mg by mouth daily   Omega-3 1400 MG CAPS Take 1 capsule by mouth daily   propranolol (INDERAL LA) 160 mg Take 1 capsule by mouth daily      ranitidine (ZANTAC) 150 mg tablet Take 150 mg by mouth 2 (two) times a day  0    vitamin E, tocopherol, 400 units capsule Take 400 Units by mouth daily   amoxicillin-clavulanate (AUGMENTIN) 875-125 mg per tablet Take 1 tablet by mouth 2 (two) times a day for 10 days 20 tablet 0    levocetirizine (XYZAL) 5 MG tablet Take 5 mg by mouth daily  No current facility-administered medications for this visit  Objective:    /80   Pulse 80   Temp 97 6 °F (36 4 °C)   Resp 20   Ht 5' 9" (1 753 m)   Wt (!) 169 kg (373 lb)   BMI 55 08 kg/m²        Physical Exam   Constitutional: He appears well-developed  No distress  HENT:   Head: Normocephalic  Mouth/Throat: No oropharyngeal exudate  B/l red nasal congestion   Eyes: Conjunctivae are normal  No scleral icterus  Neck: Neck supple  Cardiovascular: Normal rate and intact distal pulses  No murmur heard  Pulmonary/Chest: Effort normal  No respiratory distress  He has no wheezes  Abdominal: Soft  There is no tenderness  There is no rebound  Musculoskeletal: He exhibits no edema or deformity  Neurological: He is alert  He exhibits normal muscle tone  Skin: Skin is warm and dry  No rash noted  No pallor  Psychiatric: His behavior is normal  Thought content normal    Nursing note and vitals reviewed               Aparna Palma DO

## 2019-02-06 NOTE — TELEPHONE ENCOUNTER
Called and LMOM letting him know that his labs are ready for  in front office and if he wants us to mail it to call back and let anyone of the front or back office staff know  No further action  Vandana Comer MA

## 2019-02-07 ENCOUNTER — TELEPHONE (OUTPATIENT)
Dept: FAMILY MEDICINE CLINIC | Facility: CLINIC | Age: 44
End: 2019-02-07

## 2019-02-07 DIAGNOSIS — J01.00 ACUTE NON-RECURRENT MAXILLARY SINUSITIS: ICD-10-CM

## 2019-02-07 RX ORDER — AMOXICILLIN AND CLAVULANATE POTASSIUM 875; 125 MG/1; MG/1
1 TABLET, FILM COATED ORAL 2 TIMES DAILY
Qty: 20 TABLET | Refills: 0 | Status: SHIPPED | OUTPATIENT
Start: 2019-02-07 | End: 2019-02-17

## 2019-03-25 DIAGNOSIS — I10 BENIGN ESSENTIAL HYPERTENSION: ICD-10-CM

## 2019-03-25 DIAGNOSIS — J30.9 ALLERGIC RHINITIS, UNSPECIFIED SEASONALITY, UNSPECIFIED TRIGGER: ICD-10-CM

## 2019-03-25 RX ORDER — DILTIAZEM HYDROCHLORIDE 120 MG/1
CAPSULE, COATED, EXTENDED RELEASE ORAL
Qty: 90 CAPSULE | Refills: 1 | Status: SHIPPED | OUTPATIENT
Start: 2019-03-25 | End: 2020-02-05

## 2019-03-25 RX ORDER — LOSARTAN POTASSIUM AND HYDROCHLOROTHIAZIDE 12.5; 1 MG/1; MG/1
TABLET ORAL
Qty: 90 TABLET | Refills: 1 | Status: SHIPPED | OUTPATIENT
Start: 2019-03-25 | End: 2020-02-05

## 2019-03-25 RX ORDER — MONTELUKAST SODIUM 10 MG/1
TABLET ORAL
Qty: 90 TABLET | Refills: 1 | Status: SHIPPED | OUTPATIENT
Start: 2019-03-25 | End: 2020-02-05

## 2019-03-25 RX ORDER — PROPRANOLOL HYDROCHLORIDE 160 MG/1
CAPSULE, EXTENDED RELEASE ORAL
Qty: 90 CAPSULE | Refills: 1 | Status: SHIPPED | OUTPATIENT
Start: 2019-03-25 | End: 2020-02-05

## 2019-05-15 ENCOUNTER — OFFICE VISIT (OUTPATIENT)
Dept: FAMILY MEDICINE CLINIC | Facility: CLINIC | Age: 44
End: 2019-05-15
Payer: COMMERCIAL

## 2019-05-15 VITALS
TEMPERATURE: 97.8 F | BODY MASS INDEX: 46.65 KG/M2 | HEART RATE: 76 BPM | RESPIRATION RATE: 20 BRPM | WEIGHT: 315 LBS | SYSTOLIC BLOOD PRESSURE: 112 MMHG | HEIGHT: 69 IN | DIASTOLIC BLOOD PRESSURE: 70 MMHG

## 2019-05-15 DIAGNOSIS — Z00.00 HEALTHCARE MAINTENANCE: Primary | ICD-10-CM

## 2019-05-15 DIAGNOSIS — K21.00 CHRONIC REFLUX ESOPHAGITIS: ICD-10-CM

## 2019-05-15 DIAGNOSIS — H11.31 SUBCONJUNCTIVAL HEMORRHAGE OF RIGHT EYE: ICD-10-CM

## 2019-05-15 DIAGNOSIS — E66.01 MORBID OBESITY (HCC): ICD-10-CM

## 2019-05-15 DIAGNOSIS — I10 BENIGN ESSENTIAL HYPERTENSION: ICD-10-CM

## 2019-05-15 DIAGNOSIS — G47.33 OBSTRUCTIVE SLEEP APNEA: ICD-10-CM

## 2019-05-15 DIAGNOSIS — R73.01 IFG (IMPAIRED FASTING GLUCOSE): ICD-10-CM

## 2019-05-15 PROBLEM — J01.00 ACUTE NON-RECURRENT MAXILLARY SINUSITIS: Status: RESOLVED | Noted: 2018-12-10 | Resolved: 2019-05-15

## 2019-05-15 PROCEDURE — 99396 PREV VISIT EST AGE 40-64: CPT | Performed by: FAMILY MEDICINE

## 2019-09-11 DIAGNOSIS — I10 BENIGN ESSENTIAL HYPERTENSION: ICD-10-CM

## 2019-09-11 DIAGNOSIS — J30.9 ALLERGIC RHINITIS, UNSPECIFIED SEASONALITY, UNSPECIFIED TRIGGER: ICD-10-CM

## 2019-09-11 RX ORDER — PROPRANOLOL HYDROCHLORIDE 160 MG/1
CAPSULE, EXTENDED RELEASE ORAL
Qty: 90 CAPSULE | Refills: 1 | Status: SHIPPED | OUTPATIENT
Start: 2019-09-11 | End: 2020-03-09

## 2019-09-11 RX ORDER — LOSARTAN POTASSIUM AND HYDROCHLOROTHIAZIDE 12.5; 1 MG/1; MG/1
TABLET ORAL
Qty: 90 TABLET | Refills: 1 | Status: SHIPPED | OUTPATIENT
Start: 2019-09-11 | End: 2020-03-09

## 2019-09-11 RX ORDER — MONTELUKAST SODIUM 10 MG/1
TABLET ORAL
Qty: 90 TABLET | Refills: 1 | Status: SHIPPED | OUTPATIENT
Start: 2019-09-11 | End: 2020-03-09

## 2019-09-11 RX ORDER — DILTIAZEM HYDROCHLORIDE 120 MG/1
CAPSULE, COATED, EXTENDED RELEASE ORAL
Qty: 90 CAPSULE | Refills: 1 | Status: SHIPPED | OUTPATIENT
Start: 2019-09-11 | End: 2020-03-09

## 2019-10-09 ENCOUNTER — TELEPHONE (OUTPATIENT)
Dept: PULMONOLOGY | Facility: MEDICAL CENTER | Age: 44
End: 2019-10-09

## 2019-10-09 NOTE — TELEPHONE ENCOUNTER
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

## 2019-10-10 ENCOUNTER — IMMUNIZATIONS (OUTPATIENT)
Dept: FAMILY MEDICINE CLINIC | Facility: CLINIC | Age: 44
End: 2019-10-10
Payer: COMMERCIAL

## 2019-10-10 DIAGNOSIS — Z23 NEED FOR VACCINATION: Primary | ICD-10-CM

## 2019-10-10 PROCEDURE — 90471 IMMUNIZATION ADMIN: CPT

## 2019-10-10 PROCEDURE — 90686 IIV4 VACC NO PRSV 0.5 ML IM: CPT

## 2020-02-05 ENCOUNTER — OFFICE VISIT (OUTPATIENT)
Dept: FAMILY MEDICINE CLINIC | Facility: CLINIC | Age: 45
End: 2020-02-05
Payer: COMMERCIAL

## 2020-02-05 VITALS
TEMPERATURE: 99.1 F | HEART RATE: 72 BPM | WEIGHT: 315 LBS | HEIGHT: 69 IN | RESPIRATION RATE: 20 BRPM | SYSTOLIC BLOOD PRESSURE: 120 MMHG | DIASTOLIC BLOOD PRESSURE: 84 MMHG | BODY MASS INDEX: 46.65 KG/M2

## 2020-02-05 DIAGNOSIS — M1A.9XX0 CHRONIC GOUT WITHOUT TOPHUS, UNSPECIFIED CAUSE, UNSPECIFIED SITE: ICD-10-CM

## 2020-02-05 DIAGNOSIS — Z13.83 SCREENING FOR CARDIOVASCULAR, RESPIRATORY, AND GENITOURINARY DISEASES: ICD-10-CM

## 2020-02-05 DIAGNOSIS — Z12.5 PROSTATE CANCER SCREENING: ICD-10-CM

## 2020-02-05 DIAGNOSIS — E66.01 MORBID OBESITY (HCC): ICD-10-CM

## 2020-02-05 DIAGNOSIS — I10 BENIGN ESSENTIAL HYPERTENSION: ICD-10-CM

## 2020-02-05 DIAGNOSIS — Z13.6 SCREENING FOR CARDIOVASCULAR, RESPIRATORY, AND GENITOURINARY DISEASES: ICD-10-CM

## 2020-02-05 DIAGNOSIS — R73.01 IFG (IMPAIRED FASTING GLUCOSE): ICD-10-CM

## 2020-02-05 DIAGNOSIS — M67.441 DIGITAL MUCOUS CYST OF RIGHT HAND: ICD-10-CM

## 2020-02-05 DIAGNOSIS — K21.00 CHRONIC REFLUX ESOPHAGITIS: ICD-10-CM

## 2020-02-05 DIAGNOSIS — J30.9 ALLERGIC RHINITIS, UNSPECIFIED SEASONALITY, UNSPECIFIED TRIGGER: Primary | ICD-10-CM

## 2020-02-05 DIAGNOSIS — Z13.89 SCREENING FOR CARDIOVASCULAR, RESPIRATORY, AND GENITOURINARY DISEASES: ICD-10-CM

## 2020-02-05 PROCEDURE — 3079F DIAST BP 80-89 MM HG: CPT | Performed by: FAMILY MEDICINE

## 2020-02-05 PROCEDURE — 99214 OFFICE O/P EST MOD 30 MIN: CPT | Performed by: FAMILY MEDICINE

## 2020-02-05 PROCEDURE — 1036F TOBACCO NON-USER: CPT | Performed by: FAMILY MEDICINE

## 2020-02-05 PROCEDURE — 3008F BODY MASS INDEX DOCD: CPT | Performed by: FAMILY MEDICINE

## 2020-02-05 PROCEDURE — 3074F SYST BP LT 130 MM HG: CPT | Performed by: FAMILY MEDICINE

## 2020-02-05 RX ORDER — FAMOTIDINE 20 MG/1
20 TABLET, FILM COATED ORAL 2 TIMES DAILY PRN
Qty: 99 TABLET | Refills: 0
Start: 2020-02-05 | End: 2021-01-04

## 2020-02-05 RX ORDER — FLUTICASONE PROPIONATE 50 MCG
2 SPRAY, SUSPENSION (ML) NASAL DAILY
Qty: 3 BOTTLE | Refills: 3 | Status: SHIPPED | OUTPATIENT
Start: 2020-02-05

## 2020-02-05 RX ORDER — METHYLPREDNISOLONE 4 MG/1
TABLET ORAL
Qty: 21 TABLET | Refills: 0 | Status: SHIPPED | OUTPATIENT
Start: 2020-02-05 | End: 2020-02-11

## 2020-02-05 RX ORDER — ALBUTEROL SULFATE 90 UG/1
2 AEROSOL, METERED RESPIRATORY (INHALATION) EVERY 4 HOURS PRN
Qty: 3 INHALER | Refills: 1 | Status: SHIPPED | OUTPATIENT
Start: 2020-02-05

## 2020-02-05 RX ORDER — FAMOTIDINE 20 MG/1
TABLET, FILM COATED ORAL
COMMUNITY
Start: 2020-01-22 | End: 2020-02-05 | Stop reason: SDUPTHER

## 2020-02-05 NOTE — PROGRESS NOTES
Assessment/Plan:    No problem-specific Assessment & Plan notes found for this encounter  AR exacerbation suspected  Medrol given  Steroid risks aware  If gets green/yellow mucus or worse symptoms in upcoming days, he can call and I will send abx to pharmacy augmentin    htn stable  gerd stable  bmi aware, offer bariatric surgeon referral if covered  Digital mucous cyst finger #3 right hand, monitor, offer hand surgery if bothersome    Gout stable     Diagnoses and all orders for this visit:    Allergic rhinitis, unspecified seasonality, unspecified trigger  -     albuterol (PROVENTIL HFA) 90 mcg/act inhaler; Inhale 2 puffs every 4 (four) hours as needed for wheezing  -     fluticasone (FLONASE) 50 mcg/act nasal spray; 2 sprays into each nostril daily  -     methylPREDNISolone 4 MG tablet therapy pack; Use as directed on package    Benign essential hypertension    Chronic reflux esophagitis  -     famotidine (PEPCID) 20 mg tablet; Take 1 tablet (20 mg total) by mouth 2 (two) times a day as needed for heartburn    Morbid obesity (HCC)    IFG (impaired fasting glucose)  -     Comprehensive metabolic panel; Future  -     Hemoglobin A1C; Future    Prostate cancer screening  -     PSA, Total Screen; Future    Chronic gout without tophus, unspecified cause, unspecified site  -     Uric acid; Future    Screening for cardiovascular, respiratory, and genitourinary diseases  -     Lipid Panel with Direct LDL reflex; Future    Digital mucous cyst of right hand  Comments:  #3    Other orders  -     Discontinue: famotidine (PEPCID) 20 mg tablet          Return in about 3 months (around 5/18/2020) for Annual physical     Subjective:      Patient ID: Rima Moyer is a 38 y o  male      Chief Complaint   Patient presents with    Cough     for 10 days  prcma    chest congestion       HPI  Cough  Congestion  10d  Chest symptoms  No fever but has chills  Feverish  achey  Ears clogged  Crackling ears  Sinuses ok  mucinex DM tried  Some phlegm, white  Some wheezing  No inhalation exposure    The following portions of the patient's history were reviewed and updated as appropriate: allergies, current medications, past family history, past medical history, past social history, past surgical history and problem list     Review of Systems   Respiratory: Positive for cough and wheezing  Negative for shortness of breath  Cardiovascular: Negative for chest pain  Current Outpatient Medications   Medication Sig Dispense Refill    albuterol (PROVENTIL HFA) 90 mcg/act inhaler Inhale 2 puffs every 4 (four) hours as needed for wheezing 3 Inhaler 1    Ascorbic Acid (VITAMIN C) 1000 MG tablet Take 1,000 mg by mouth      cetirizine (ZyrTEC) 10 mg tablet Take 1 tablet by mouth daily      diltiazem (CARDIZEM CD) 120 mg 24 hr capsule TAKE 1 CAPSULE DAILY 90 capsule 1    famotidine (PEPCID) 20 mg tablet Take 1 tablet (20 mg total) by mouth 2 (two) times a day as needed for heartburn 99 tablet 0    levocetirizine (XYZAL) 5 MG tablet Take 5 mg by mouth daily   losartan-hydrochlorothiazide (HYZAAR) 100-12 5 MG per tablet TAKE 1 TABLET DAILY 90 tablet 1    montelukast (SINGULAIR) 10 mg tablet TAKE 1 TABLET DAILY 90 tablet 1    Omega-3 1400 MG CAPS Take 1 capsule by mouth daily   propranolol (INDERAL LA) 160 mg TAKE 1 CAPSULE DAILY 90 capsule 1    vitamin E, tocopherol, 400 units capsule Take 400 Units by mouth daily   fluticasone (FLONASE) 50 mcg/act nasal spray 2 sprays into each nostril daily 3 Bottle 3    methylPREDNISolone 4 MG tablet therapy pack Use as directed on package 21 tablet 0     No current facility-administered medications for this visit  Objective:    /84   Pulse 72   Temp 99 1 °F (37 3 °C)   Resp 20   Ht 5' 9" (1 753 m)   Wt (!) 194 kg (427 lb)   BMI 63 06 kg/m²        Physical Exam   Constitutional: He appears well-developed  No distress  HENT:   Head: Normocephalic     Right Ear: External ear normal    Left Ear: External ear normal    Mouth/Throat: No oropharyngeal exudate  Eyes: Conjunctivae are normal  No scleral icterus  Neck: Neck supple  Cardiovascular: Normal rate, regular rhythm and intact distal pulses  Pulmonary/Chest: Effort normal and breath sounds normal  No respiratory distress  He has no wheezes  He has no rales  Abdominal: Soft  Musculoskeletal: He exhibits no edema or deformity  Lymphadenopathy:     He has no cervical adenopathy  Neurological: He is alert  Skin: Skin is warm and dry  No pallor  Psychiatric: His behavior is normal  Thought content normal    Nursing note and vitals reviewed  BMI Counseling: Body mass index is 63 06 kg/m²  The BMI is above normal  Nutrition recommendations include decreasing portion sizes and moderation in carbohydrate intake  Exercise recommendations include exercising 3-5 times per week             Remonia Lunch, DO

## 2020-02-17 ENCOUNTER — TELEPHONE (OUTPATIENT)
Dept: FAMILY MEDICINE CLINIC | Facility: CLINIC | Age: 45
End: 2020-02-17

## 2020-02-17 DIAGNOSIS — J01.00 ACUTE NON-RECURRENT MAXILLARY SINUSITIS: Primary | ICD-10-CM

## 2020-02-17 RX ORDER — AMOXICILLIN AND CLAVULANATE POTASSIUM 875; 125 MG/1; MG/1
1 TABLET, FILM COATED ORAL 2 TIMES DAILY
Qty: 20 TABLET | Refills: 0 | Status: SHIPPED | OUTPATIENT
Start: 2020-02-17 | End: 2020-02-27

## 2020-02-17 NOTE — TELEPHONE ENCOUNTER
DR Cedrick Jessica    Patient is asking if you can call in an antibiotic for him    He said he has been sick since 2/05/2020

## 2020-03-03 ENCOUNTER — TELEPHONE (OUTPATIENT)
Dept: FAMILY MEDICINE CLINIC | Facility: CLINIC | Age: 45
End: 2020-03-03

## 2020-03-03 NOTE — TELEPHONE ENCOUNTER
Called [facility]  ID [id number]  Flonase is not covered at all on pt's plan  Called and left a message  Encouraged pt to either use GoodRx or go online to see if there is an offer from the company   Task Complete  [id number]

## 2020-03-03 NOTE — TELEPHONE ENCOUNTER
Patient needs Prior Auth on   FLOUTICASONE SPR TRO 50mcg 16 gm    Please call patient when PA is done

## 2020-03-09 DIAGNOSIS — I10 BENIGN ESSENTIAL HYPERTENSION: ICD-10-CM

## 2020-03-09 DIAGNOSIS — J30.9 ALLERGIC RHINITIS, UNSPECIFIED SEASONALITY, UNSPECIFIED TRIGGER: ICD-10-CM

## 2020-03-09 RX ORDER — PROPRANOLOL HYDROCHLORIDE 160 MG/1
CAPSULE, EXTENDED RELEASE ORAL
Qty: 90 CAPSULE | Refills: 1 | Status: SHIPPED | OUTPATIENT
Start: 2020-03-09 | End: 2020-09-01

## 2020-03-09 RX ORDER — MONTELUKAST SODIUM 10 MG/1
TABLET ORAL
Qty: 90 TABLET | Refills: 1 | Status: SHIPPED | OUTPATIENT
Start: 2020-03-09 | End: 2020-09-01

## 2020-03-09 RX ORDER — DILTIAZEM HYDROCHLORIDE 120 MG/1
CAPSULE, COATED, EXTENDED RELEASE ORAL
Qty: 90 CAPSULE | Refills: 1 | Status: SHIPPED | OUTPATIENT
Start: 2020-03-09 | End: 2020-09-01

## 2020-03-09 RX ORDER — LOSARTAN POTASSIUM AND HYDROCHLOROTHIAZIDE 12.5; 1 MG/1; MG/1
TABLET ORAL
Qty: 90 TABLET | Refills: 1 | Status: SHIPPED | OUTPATIENT
Start: 2020-03-09 | End: 2020-09-01

## 2020-07-02 LAB
ALBUMIN SERPL-MCNC: 4.4 G/DL (ref 3.6–5.1)
ALBUMIN/GLOB SERPL: 1.3 (CALC) (ref 1–2.5)
ALP SERPL-CCNC: 75 U/L (ref 36–130)
ALT SERPL-CCNC: 19 U/L (ref 9–46)
AST SERPL-CCNC: 15 U/L (ref 10–40)
BILIRUB SERPL-MCNC: 0.6 MG/DL (ref 0.2–1.2)
BUN SERPL-MCNC: 15 MG/DL (ref 7–25)
BUN/CREAT SERPL: ABNORMAL (CALC) (ref 6–22)
CALCIUM SERPL-MCNC: 9.5 MG/DL (ref 8.6–10.3)
CHLORIDE SERPL-SCNC: 99 MMOL/L (ref 98–110)
CHOLEST SERPL-MCNC: 209 MG/DL
CHOLEST/HDLC SERPL: 4.9 (CALC)
CO2 SERPL-SCNC: 35 MMOL/L (ref 20–32)
CREAT SERPL-MCNC: 0.86 MG/DL (ref 0.6–1.35)
GLOBULIN SER CALC-MCNC: 3.5 G/DL (CALC) (ref 1.9–3.7)
GLUCOSE SERPL-MCNC: 106 MG/DL (ref 65–99)
HBA1C MFR BLD: 6.1 % OF TOTAL HGB
HDLC SERPL-MCNC: 43 MG/DL
LDLC SERPL CALC-MCNC: 140 MG/DL (CALC)
NONHDLC SERPL-MCNC: 166 MG/DL (CALC)
POTASSIUM SERPL-SCNC: 4.2 MMOL/L (ref 3.5–5.3)
PROT SERPL-MCNC: 7.9 G/DL (ref 6.1–8.1)
PSA SERPL-MCNC: 1.1 NG/ML
SL AMB EGFR AFRICAN AMERICAN: 121 ML/MIN/1.73M2
SL AMB EGFR NON AFRICAN AMERICAN: 104 ML/MIN/1.73M2
SODIUM SERPL-SCNC: 141 MMOL/L (ref 135–146)
TRIGL SERPL-MCNC: 132 MG/DL
URATE SERPL-MCNC: 8 MG/DL (ref 4–8)

## 2020-09-01 DIAGNOSIS — J30.9 ALLERGIC RHINITIS, UNSPECIFIED SEASONALITY, UNSPECIFIED TRIGGER: ICD-10-CM

## 2020-09-01 DIAGNOSIS — I10 BENIGN ESSENTIAL HYPERTENSION: ICD-10-CM

## 2020-09-01 RX ORDER — LOSARTAN POTASSIUM AND HYDROCHLOROTHIAZIDE 12.5; 1 MG/1; MG/1
TABLET ORAL
Qty: 90 TABLET | Refills: 1 | Status: SHIPPED | OUTPATIENT
Start: 2020-09-01 | End: 2021-02-23

## 2020-09-01 RX ORDER — MONTELUKAST SODIUM 10 MG/1
TABLET ORAL
Qty: 90 TABLET | Refills: 1 | Status: SHIPPED | OUTPATIENT
Start: 2020-09-01 | End: 2021-02-23

## 2020-09-01 RX ORDER — DILTIAZEM HYDROCHLORIDE 120 MG/1
CAPSULE, COATED, EXTENDED RELEASE ORAL
Qty: 90 CAPSULE | Refills: 1 | Status: SHIPPED | OUTPATIENT
Start: 2020-09-01 | End: 2021-02-23

## 2020-09-01 RX ORDER — PROPRANOLOL HYDROCHLORIDE 160 MG/1
CAPSULE, EXTENDED RELEASE ORAL
Qty: 90 CAPSULE | Refills: 1 | Status: SHIPPED | OUTPATIENT
Start: 2020-09-01 | End: 2021-02-23

## 2021-01-03 DIAGNOSIS — K21.00 CHRONIC REFLUX ESOPHAGITIS: ICD-10-CM

## 2021-01-04 RX ORDER — FAMOTIDINE 20 MG/1
TABLET, FILM COATED ORAL
Qty: 180 TABLET | Refills: 1 | Status: SHIPPED | OUTPATIENT
Start: 2021-01-04 | End: 2021-06-21

## 2021-02-19 DIAGNOSIS — I10 BENIGN ESSENTIAL HYPERTENSION: ICD-10-CM

## 2021-02-19 DIAGNOSIS — J30.9 ALLERGIC RHINITIS, UNSPECIFIED SEASONALITY, UNSPECIFIED TRIGGER: ICD-10-CM

## 2021-02-23 RX ORDER — MONTELUKAST SODIUM 10 MG/1
TABLET ORAL
Qty: 90 TABLET | Refills: 1 | Status: SHIPPED | OUTPATIENT
Start: 2021-02-23 | End: 2021-08-18

## 2021-02-23 RX ORDER — LOSARTAN POTASSIUM AND HYDROCHLOROTHIAZIDE 12.5; 1 MG/1; MG/1
TABLET ORAL
Qty: 90 TABLET | Refills: 1 | Status: SHIPPED | OUTPATIENT
Start: 2021-02-23 | End: 2021-08-18

## 2021-02-23 RX ORDER — DILTIAZEM HYDROCHLORIDE 120 MG/1
CAPSULE, COATED, EXTENDED RELEASE ORAL
Qty: 90 CAPSULE | Refills: 1 | Status: SHIPPED | OUTPATIENT
Start: 2021-02-23 | End: 2021-08-18

## 2021-02-23 RX ORDER — PROPRANOLOL HYDROCHLORIDE 160 MG/1
CAPSULE, EXTENDED RELEASE ORAL
Qty: 90 CAPSULE | Refills: 1 | Status: SHIPPED | OUTPATIENT
Start: 2021-02-23 | End: 2021-08-18

## 2021-02-27 PROBLEM — R73.03 PREDIABETES: Status: ACTIVE | Noted: 2020-10-26

## 2021-02-27 PROBLEM — R73.01 IFG (IMPAIRED FASTING GLUCOSE): Status: RESOLVED | Noted: 2017-05-26 | Resolved: 2021-02-27

## 2021-03-05 ENCOUNTER — OFFICE VISIT (OUTPATIENT)
Dept: FAMILY MEDICINE CLINIC | Facility: CLINIC | Age: 46
End: 2021-03-05
Payer: COMMERCIAL

## 2021-03-05 VITALS
HEIGHT: 69 IN | DIASTOLIC BLOOD PRESSURE: 64 MMHG | RESPIRATION RATE: 16 BRPM | OXYGEN SATURATION: 97 % | WEIGHT: 315 LBS | BODY MASS INDEX: 46.65 KG/M2 | SYSTOLIC BLOOD PRESSURE: 104 MMHG | HEART RATE: 78 BPM | TEMPERATURE: 97.8 F

## 2021-03-05 DIAGNOSIS — Z13.1 SCREENING FOR DIABETES MELLITUS (DM): ICD-10-CM

## 2021-03-05 DIAGNOSIS — Z13.83 SCREENING FOR CARDIOVASCULAR, RESPIRATORY, AND GENITOURINARY DISEASES: ICD-10-CM

## 2021-03-05 DIAGNOSIS — E66.01 MORBID OBESITY (HCC): ICD-10-CM

## 2021-03-05 DIAGNOSIS — Z13.6 SCREENING FOR CARDIOVASCULAR, RESPIRATORY, AND GENITOURINARY DISEASES: ICD-10-CM

## 2021-03-05 DIAGNOSIS — I10 BENIGN ESSENTIAL HYPERTENSION: Primary | ICD-10-CM

## 2021-03-05 DIAGNOSIS — M1A.09X0 CHRONIC GOUT OF MULTIPLE SITES, UNSPECIFIED CAUSE: ICD-10-CM

## 2021-03-05 DIAGNOSIS — R73.03 PREDIABETES: ICD-10-CM

## 2021-03-05 DIAGNOSIS — Z13.89 SCREENING FOR CARDIOVASCULAR, RESPIRATORY, AND GENITOURINARY DISEASES: ICD-10-CM

## 2021-03-05 DIAGNOSIS — Z12.5 PROSTATE CANCER SCREENING: ICD-10-CM

## 2021-03-05 PROBLEM — H11.31 SUBCONJUNCTIVAL HEMORRHAGE OF RIGHT EYE: Status: RESOLVED | Noted: 2019-05-15 | Resolved: 2021-03-05

## 2021-03-05 PROBLEM — E66.813 CLASS 3 SEVERE OBESITY DUE TO EXCESS CALORIES WITH SERIOUS COMORBIDITY AND BODY MASS INDEX (BMI) OF 50.0 TO 59.9 IN ADULT (HCC): Status: RESOLVED | Noted: 2018-11-12 | Resolved: 2021-03-05

## 2021-03-05 PROCEDURE — 3725F SCREEN DEPRESSION PERFORMED: CPT | Performed by: FAMILY MEDICINE

## 2021-03-05 PROCEDURE — 99214 OFFICE O/P EST MOD 30 MIN: CPT | Performed by: FAMILY MEDICINE

## 2021-03-05 PROCEDURE — 3008F BODY MASS INDEX DOCD: CPT | Performed by: FAMILY MEDICINE

## 2021-03-05 PROCEDURE — 1036F TOBACCO NON-USER: CPT | Performed by: FAMILY MEDICINE

## 2021-03-05 NOTE — PROGRESS NOTES
Assessment/Plan:    No problem-specific Assessment & Plan notes found for this encounter     htn stable, continue meds, update labs  Prediabetes, watch calories, try to lose weight,   Gout stable, infrequent attacks  BMI aware, he was considering bariatrics at one time but now on hold due to pandemic, diet suggested       Diagnoses and all orders for this visit:    Benign essential hypertension  -     Comprehensive metabolic panel; Future  -     Hemoglobin A1C; Future    Prediabetes  -     Comprehensive metabolic panel; Future  -     Hemoglobin A1C; Future  -     Hemoglobin A1C; Future    Screening for cardiovascular, respiratory, and genitourinary diseases  -     Lipid Panel with Direct LDL reflex; Future    Screening for diabetes mellitus (DM)  -     Comprehensive metabolic panel; Future    Chronic gout of multiple sites, unspecified cause  -     Uric acid; Future    Prostate cancer screening  -     PSA, Total Screen; Future    Morbid obesity ([facility] [address] )              Return in about 4 months (around 7/8/2021) for Annual physical     Subjective:      Patient ID: Artemio Michael is a 51 y o  male  Chief Complaint   Patient presents with    Follow-up     follow up on meds [id number]        HPI  Has marlo  Taking all meds  Gained then lost wt  Eating healthy  Watches calories/carbs  Gout hx  No daily meds    The following portions of the patient's history were reviewed and updated as appropriate: allergies, current medications, past family history, past medical history, past social history, past surgical history and problem list     Review of Systems   Constitutional: Negative for fever  Cardiovascular: Negative for chest pain           Current Outpatient Medications   Medication Sig Dispense Refill    albuterol (PROVENTIL HFA) 90 mcg/act inhaler Inhale 2 puffs every 4 (four) hours as needed for wheezing 3 Inhaler 1    Ascorbic Acid (VITAMIN C) 1000 MG tablet Take 1,000 mg by mouth      cetirizine (ZyrTEC) 10 mg tablet Take 1 tablet by mouth daily      diltiazem (CARDIZEM CD) 120 mg 24 hr capsule TAKE 1 CAPSULE DAILY 90 capsule 1    famotidine (PEPCID) 20 mg tablet TAKE 1 TABLET TWICE A DAY  AS NEEDED FOR REFLUX 180 tablet 1    fluticasone (FLONASE) 50 mcg/act nasal spray 2 sprays into each nostril daily 3 Bottle 3    losartan-hydrochlorothiazide (HYZAAR) 100-12 5 MG per tablet TAKE 1 TABLET DAILY 90 tablet 1    montelukast (SINGULAIR) 10 mg tablet TAKE 1 TABLET DAILY 90 tablet 1    Omega-3 1400 MG CAPS Take 1 capsule by mouth daily   propranolol (INDERAL LA) 160 mg TAKE 1 CAPSULE DAILY 90 capsule 1    vitamin E, tocopherol, 400 units capsule Take 400 Units by mouth daily   levocetirizine (XYZAL) 5 MG tablet Take 5 mg by mouth daily  No current facility-administered medications for this visit  Objective:    /64   Pulse 78   Temp 97 8 °F (36 6 °C)   Resp 16   Ht 5' 8 75" (1 746 m)   Wt (!) 195 kg (430 lb)   SpO2 97%   BMI 63 96 kg/m²        Physical Exam  Vitals signs and nursing note reviewed  Constitutional:       Appearance: He is well-developed  He is obese  He is not ill-appearing  HENT:      Head: Normocephalic  Right Ear: Tympanic membrane normal       Left Ear: Tympanic membrane normal    Eyes:      General: No scleral icterus  Conjunctiva/sclera: Conjunctivae normal    Neck:      Musculoskeletal: Neck supple  Cardiovascular:      Rate and Rhythm: Normal rate and regular rhythm  Heart sounds: No murmur  Pulmonary:      Effort: Pulmonary effort is normal  No respiratory distress  Abdominal:      General: There is distension  Palpations: Abdomen is soft  Tenderness: There is no abdominal tenderness  Musculoskeletal:         General: No deformity  Skin:     General: Skin is warm and dry  Coloration: Skin is not pale  Findings: No rash  Neurological:      Mental Status: He is alert  Motor: No weakness        Gait: Gait normal    Psychiatric:         Behavior: Behavior normal          Thought Content: Thought content normal          BMI Counseling: Body mass index is 63 96 kg/m²  The BMI is above normal  Nutrition recommendations include decreasing portion sizes and moderation in carbohydrate intake  Exercise recommendations include exercising 3-5 times per week  No pharmacotherapy was ordered                Rolanda Stout DO

## 2021-03-31 DIAGNOSIS — Z23 ENCOUNTER FOR IMMUNIZATION: ICD-10-CM

## 2021-04-03 ENCOUNTER — IMMUNIZATIONS (OUTPATIENT)
Dept: FAMILY MEDICINE CLINIC | Facility: HOSPITAL | Age: 46
End: 2021-04-03

## 2021-04-03 DIAGNOSIS — Z23 ENCOUNTER FOR IMMUNIZATION: Primary | ICD-10-CM

## 2021-04-03 PROCEDURE — 0001A SARS-COV-2 / COVID-19 MRNA VACCINE (PFIZER-BIONTECH) 30 MCG: CPT

## 2021-04-03 PROCEDURE — 91300 SARS-COV-2 / COVID-19 MRNA VACCINE (PFIZER-BIONTECH) 30 MCG: CPT

## 2021-04-16 ENCOUNTER — OFFICE VISIT (OUTPATIENT)
Dept: PULMONOLOGY | Facility: MEDICAL CENTER | Age: 46
End: 2021-04-16
Payer: COMMERCIAL

## 2021-04-16 VITALS
BODY MASS INDEX: 46.65 KG/M2 | OXYGEN SATURATION: 93 % | WEIGHT: 315 LBS | HEIGHT: 69 IN | HEART RATE: 80 BPM | RESPIRATION RATE: 12 BRPM | DIASTOLIC BLOOD PRESSURE: 86 MMHG | TEMPERATURE: 97.9 F | SYSTOLIC BLOOD PRESSURE: 122 MMHG

## 2021-04-16 DIAGNOSIS — G47.33 OBSTRUCTIVE SLEEP APNEA: Primary | ICD-10-CM

## 2021-04-16 DIAGNOSIS — I10 BENIGN ESSENTIAL HYPERTENSION: ICD-10-CM

## 2021-04-16 DIAGNOSIS — E66.01 MORBID OBESITY (HCC): ICD-10-CM

## 2021-04-16 DIAGNOSIS — J30.1 SEASONAL ALLERGIC RHINITIS DUE TO POLLEN: ICD-10-CM

## 2021-04-16 PROCEDURE — 3079F DIAST BP 80-89 MM HG: CPT | Performed by: INTERNAL MEDICINE

## 2021-04-16 PROCEDURE — 99214 OFFICE O/P EST MOD 30 MIN: CPT | Performed by: INTERNAL MEDICINE

## 2021-04-16 PROCEDURE — 3008F BODY MASS INDEX DOCD: CPT | Performed by: INTERNAL MEDICINE

## 2021-04-16 PROCEDURE — 3074F SYST BP LT 130 MM HG: CPT | Performed by: INTERNAL MEDICINE

## 2021-04-16 PROCEDURE — 1036F TOBACCO NON-USER: CPT | Performed by: INTERNAL MEDICINE

## 2021-04-16 NOTE — PATIENT INSTRUCTIONS
I will change her CPAP setting from 12-10 to 14  This is auto range    If you do not like call our office and I can adjust    Goal is to see AHI less than     Next time he have blood work done go for serum [facility] allergy panel    follow up in 1 year    Peak flow meter for asthma    [facility]

## 2021-04-16 NOTE — PROGRESS NOTES
Assessment/Plan        Problem List Items Addressed This Visit        Respiratory    Allergic rhinitis      He does take Zyrtec 10 mg daily for this and can use Flonase nasal spray as needed  Also takes montelukast 10 mg daily  I did give him prescription for [address] allergy panel         Relevant Orders    [place] Allergy Panel, Adult    Obstructive sleep apnea - Primary     Severe SAGAR with good compliance and benefit to CPAP therapy  Initial diagnostic sleep study done March 7, 2003 showed AHI of 112 point with oxygen mor 73%  He has been on CPAP pressure of 12 cm water with nasal pillow mask  He needs new CPAP machine  I did review compliance data from last 2 months  Used to CPAP and regular basis an average 7 hours of usage per night  This resulted in AHI of 4 9 which is satisfactory  Uses nasal pillow interface  Will place order for new CPAP machine and change to auto CPAP at setting of 11-15 cm water  Will request nasal pillows as that is when he is using presently  Medical supply company is [employer] [facility]    He has been compliant with using CPAP therapy not having any excessive daytime somnolence  Relevant Orders    CPAP Auto New DME       Cardiovascular and Mediastinum    Benign essential hypertension       Does take propanolol diltiazem for his blood pressure  Blood pressure setting is normal today  Also is on losartan -hydrochlorothiazide  Other    Morbid obesity ([address] )       I did talk about weight loss and to watch his carbohydrate intake               :  CC:    Doing well with  CPAP machine      HPI       Jenelle Ayala has severe obstructive sleep apnea  His initial diagnostic sleep study was done in 2003 and he is presently on CPAP at 12 cm   Of water pressure  Also has history of seasonal allergies  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  He has  for [address] and works 12 hours a day for 5 days a week  He denies any excessive sleepiness when he is driving  No excessive daytime fatigue during the day  Juan R Crow  does have history of hypertension    He was set up with his present  CPAP machine on December 1, 2015  He is on 12 centimeters water pressure  He was diagnosed with obstructive sleep apnea in 2003  He use to see pulmonologist Dr Gennaro Dodge who retired    He does use albuterol via occasionally particular when this change in weather he may get wheeze  Not have any nocturnal dyspnea  Doing well with his CPAP setting  He is due for a new CPAP machine  Medical supply company is [employer]     His initial diagnostic sleep study was done March 7, 2003 this showed overall AHI of 112 1 with oxygen mor 73%  Snoring was detected  He had 478 obstructive apneas, 4 central apneas, 3 mixed apneas and 79 hypopneas  Juan R Crow has been compliant and benefiting from using CPAP therapy  Not having any excessive daytime somnolence  No nocturnal dyspnea  Past Medical History:   Diagnosis Date    Acid reflux     Cellulitis     RESOLVED:5/6/15    Hypertension     Loose body in knee     RESOLVED:11/15/16    Neoplasm of bone 01/28/2008    LAST ASSESSED: 1/28/08    Patellar tendonitis     UNSPECIFIED LATERALITY, LAST ASSESSED: 10/19/12    Seasonal allergies     Seborrheic dermatitis 01/20/2010    LAST ASSESSED: 1/20/10    Sleep apnea     wears cpap at night    Sprain and strain     LATE EFFECT; RESOLVED: 5/6/15       Past Surgical History:   Procedure Laterality Date    CHOLECYSTECTOMY      FINGER FRACTURE SURGERY Left 1996    pinky    KNEE ARTHROSCOPY Bilateral     left knee growth removed    KNEE SURGERY Right 1992    LIPOMA RESECTION      left side    DC EGD TRANSORAL BIOPSY SINGLE/MULTIPLE N/A 2/22/2016    Procedure: ESOPHAGOGASTRODUODENOSCOPY (EGD); Surgeon: Braden Gerber MD;  Location: [facility];   Service: Gastroenterology         Current Outpatient Medications:     albuterol (PROVENTIL HFA) 90 mcg/act inhaler, Inhale 2 puffs every 4 (four) hours as needed for wheezing, Disp: 3 Inhaler, Rfl: 1    Ascorbic Acid (VITAMIN C) 1000 MG tablet, Take 1,000 mg by mouth, Disp: , Rfl:     cetirizine (ZyrTEC) 10 mg tablet, Take 1 tablet by mouth daily, Disp: , Rfl:     diltiazem (CARDIZEM CD) 120 mg 24 hr capsule, TAKE 1 CAPSULE DAILY, Disp: 90 capsule, Rfl: 1    famotidine (PEPCID) 20 mg tablet, TAKE 1 TABLET TWICE A DAY  AS NEEDED FOR REFLUX, Disp: 180 tablet, Rfl: 1    fluticasone (FLONASE) 50 mcg/act nasal spray, 2 sprays into each nostril daily, Disp: 3 Bottle, Rfl: 3    losartan-hydrochlorothiazide (HYZAAR) 100-12 5 MG per tablet, TAKE 1 TABLET DAILY, Disp: 90 tablet, Rfl: 1    montelukast (SINGULAIR) 10 mg tablet, TAKE 1 TABLET DAILY, Disp: 90 tablet, Rfl: 1    Omega-3 1400 MG CAPS, Take 1 capsule by mouth daily  , Disp: , Rfl:     propranolol (INDERAL LA) 160 mg, TAKE 1 CAPSULE DAILY, Disp: 90 capsule, Rfl: 1    vitamin E, tocopherol, 400 units capsule, Take 400 Units by mouth daily  , Disp: , Rfl:     Allergies   Allergen Reactions    Seasonal Ic [Cholestatin]        Social History     Tobacco Use    Smoking status: Never Smoker    Smokeless tobacco: Never Used   Substance Use Topics    Alcohol use: No         Family History   Problem Relation Age of Onset    Cancer Maternal Grandmother     Breast cancer Mother     Atrial fibrillation Father     Sleep apnea Father     Arthritis Family     Breast cancer Family     Stomach cancer Family     Liver cancer Family        Review of Systems   Constitutional: Negative for chills, fatigue, fever and unexpected weight change  HENT: Negative for congestion, rhinorrhea and sore throat  Eyes: Negative for discharge and redness  Respiratory: Negative for shortness of breath  Cardiovascular: Negative for chest pain, palpitations and leg swelling  Gastrointestinal: Negative for abdominal distention, abdominal pain and nausea  Endocrine: Negative for polydipsia and polyphagia  Genitourinary: Negative for dysuria  Musculoskeletal: Negative for joint swelling and myalgias  Skin: Negative for rash  Neurological: Negative for light-headedness  Psychiatric/Behavioral: Negative for decreased concentration  Vitals:    04/16/21 1333   BP: 122/86   Pulse: 80   Resp: 12   Temp: 97 9 °F (36 6 °C)   SpO2: 93%           Physical Exam  Vitals signs reviewed  Constitutional:       General: He is not in acute distress  Appearance: He is well-developed  He is obese  Comments: Room air O2 saturation 95%   HENT:      Head: Normocephalic  Nose: Nose normal       Mouth/Throat:      Mouth: Mucous membranes are dry  Pharynx: Oropharynx is clear  No oropharyngeal exudate  Comments: Mallampati score is 3  There is moderate crowding of oropharyngeal airway  Eyes:      Conjunctiva/sclera: Conjunctivae normal       Pupils: Pupils are equal, round, and reactive to light  Neck:      Musculoskeletal: Neck supple  Cardiovascular:      Rate and Rhythm: Normal rate and regular rhythm  Heart sounds: Normal heart sounds  Pulmonary:      Effort: Pulmonary effort is normal       Comments: Lung sounds are clear  No wheezes, crackles or rhonchi  Abdominal:      General: There is no distension  Palpations: Abdomen is soft  Tenderness: There is no abdominal tenderness  Musculoskeletal:      Comments: Trace edema lower extremities  No cyanosis or clubbing   Lymphadenopathy:      Cervical: No cervical adenopathy  Skin:     General: Skin is warm and dry  Neurological:      Mental Status: He is alert and oriented to person, place, and time  Psychiatric:         Mood and Affect: Mood normal          Thought Content:  Thought content normal

## 2021-04-18 NOTE — ASSESSMENT & PLAN NOTE
Does take propanolol diltiazem for his blood pressure  Blood pressure setting is normal today  Also is on losartan -hydrochlorothiazide

## 2021-04-18 NOTE — ASSESSMENT & PLAN NOTE
He does take Zyrtec 10 mg daily for this and can use Flonase nasal spray as needed  Also takes montelukast 10 mg daily    I did give him prescription for [facility] allergy panel

## 2021-04-18 NOTE — ASSESSMENT & PLAN NOTE
Severe SAGAR with good compliance and benefit to CPAP therapy  Initial diagnostic sleep study done March 7, 2003 showed AHI of 112 point with oxygen mor 73%  He has been on CPAP pressure of 12 cm water with nasal pillow mask  He needs new CPAP machine  I did review compliance data from last 2 months  Used to CPAP and regular basis an average 7 hours of usage per night  This resulted in AHI of 4 9 which is satisfactory  Uses nasal pillow interface  Will place order for new CPAP machine and change to auto CPAP at setting of 11-15 cm water  Will request nasal pillows as that is when he is using presently  Medical supply company is [facility]    He has been compliant with using CPAP therapy not having any excessive daytime somnolence

## 2021-04-24 ENCOUNTER — IMMUNIZATIONS (OUTPATIENT)
Dept: FAMILY MEDICINE CLINIC | Facility: HOSPITAL | Age: 46
End: 2021-04-24

## 2021-04-24 DIAGNOSIS — Z23 ENCOUNTER FOR IMMUNIZATION: Primary | ICD-10-CM

## 2021-04-24 PROCEDURE — 0002A SARS-COV-2 / COVID-19 MRNA VACCINE (PFIZER-BIONTECH) 30 MCG: CPT

## 2021-04-24 PROCEDURE — 91300 SARS-COV-2 / COVID-19 MRNA VACCINE (PFIZER-BIONTECH) 30 MCG: CPT

## 2021-07-27 LAB
ALBUMIN SERPL-MCNC: 4.3 G/DL (ref 4–5)
ALBUMIN/GLOB SERPL: 1.3 {RATIO} (ref 1.2–2.2)
ALP SERPL-CCNC: 78 IU/L (ref 48–121)
ALT SERPL-CCNC: 21 IU/L (ref 0–44)
AST SERPL-CCNC: 17 IU/L (ref 0–40)
BILIRUB SERPL-MCNC: 0.6 MG/DL (ref 0–1.2)
BUN SERPL-MCNC: 18 MG/DL (ref 6–24)
BUN/CREAT SERPL: 18 (ref 9–20)
CALCIUM SERPL-MCNC: 9.5 MG/DL (ref 8.7–10.2)
CHLORIDE SERPL-SCNC: 100 MMOL/L (ref 96–106)
CHOLEST SERPL-MCNC: 190 MG/DL (ref 100–199)
CO2 SERPL-SCNC: 25 MMOL/L (ref 20–29)
CREAT SERPL-MCNC: 0.98 MG/DL (ref 0.76–1.27)
GLOBULIN SER-MCNC: 3.4 G/DL (ref 1.5–4.5)
GLUCOSE SERPL-MCNC: 112 MG/DL (ref 65–99)
HBA1C MFR BLD: 6.2 % (ref 4.8–5.6)
HDLC SERPL-MCNC: 40 MG/DL
LDLC SERPL CALC-MCNC: 130 MG/DL (ref 0–99)
MICRODELETION SYND BLD/T FISH: NORMAL
POTASSIUM SERPL-SCNC: 4 MMOL/L (ref 3.5–5.2)
PROT SERPL-MCNC: 7.7 G/DL (ref 6–8.5)
PSA SERPL-MCNC: 1.2 NG/ML (ref 0–4)
SL AMB EGFR AFRICAN AMERICAN: 106 ML/MIN/1.73
SL AMB EGFR NON AFRICAN AMERICAN: 91 ML/MIN/1.73
SODIUM SERPL-SCNC: 140 MMOL/L (ref 134–144)
TRIGL SERPL-MCNC: 112 MG/DL (ref 0–149)
URATE SERPL-MCNC: 7.5 MG/DL (ref 3.8–8.4)

## 2021-08-03 LAB
A ALTERNATA IGE QN: 0.17 KU/L
A FUMIGATUS IGE QN: 0.15 KU/L
BERMUDA GRASS IGE QN: 0.4 KU/L
BOXELDER IGE QN: 0.51 KU/L
C HERBARUM IGE QN: <0.1 KU/L
CAT DANDER IGE QN: 0.11 KU/L
CMN PIGWEED IGE QN: 0.44 KU/L
COMMON RAGWEED IGE QN: 0.64 KU/L
COTTONWOOD IGE QN: 0.43 KU/L
D FARINAE IGE QN: 10.5 KU/L
D PTERONYSS IGE QN: 9.5 KU/L
DOG DANDER IGE QN: 0.13 KU/L
IGE SERPL-ACNC: 708 IU/ML (ref 6–495)
LONDON PLANE IGE QN: 0.46 KU/L
Lab: ABNORMAL
MOUSE URINE PROT IGE QN: <0.1 KU/L
MT JUNIPER IGE QN: 0.41 KU/L
MUGWORT IGE QN: 0.44 KU/L
PENICILLIUM CHRYSOGENUM: 0.1 KU/L
ROACH IGE QN: 1.37 KU/L
SHEEP SORREL IGE QN: 0.43 KU/L
SILVER BIRCH IGE QN: 0.39 KU/L
TIMOTHY IGE QN: 0.39 KU/L
WALNUT IGE: 0.54 KU/L
WHITE ASH IGE QN: 0.51 KU/L
WHITE ELM IGE QN: 0.45 KU/L
WHITE MULBERRY IGE QN: 0.42 KU/L
WHITE OAK IGE QN: 0.47 KU/L

## 2021-08-10 ENCOUNTER — OFFICE VISIT (OUTPATIENT)
Dept: FAMILY MEDICINE CLINIC | Facility: CLINIC | Age: 46
End: 2021-08-10
Payer: COMMERCIAL

## 2021-08-10 VITALS
TEMPERATURE: 98.5 F | HEART RATE: 74 BPM | SYSTOLIC BLOOD PRESSURE: 120 MMHG | OXYGEN SATURATION: 97 % | BODY MASS INDEX: 46.65 KG/M2 | DIASTOLIC BLOOD PRESSURE: 70 MMHG | WEIGHT: 315 LBS | HEIGHT: 69 IN | RESPIRATION RATE: 16 BRPM

## 2021-08-10 DIAGNOSIS — B00.2 HERPES GINGIVOSTOMATITIS: ICD-10-CM

## 2021-08-10 DIAGNOSIS — R73.03 PREDIABETES: ICD-10-CM

## 2021-08-10 DIAGNOSIS — I10 BENIGN ESSENTIAL HYPERTENSION: ICD-10-CM

## 2021-08-10 DIAGNOSIS — Z00.00 HEALTHCARE MAINTENANCE: Primary | ICD-10-CM

## 2021-08-10 DIAGNOSIS — E66.01 MORBID OBESITY (HCC): ICD-10-CM

## 2021-08-10 DIAGNOSIS — J30.9 ALLERGIC RHINITIS, UNSPECIFIED SEASONALITY, UNSPECIFIED TRIGGER: ICD-10-CM

## 2021-08-10 DIAGNOSIS — K21.00 CHRONIC REFLUX ESOPHAGITIS: ICD-10-CM

## 2021-08-10 PROCEDURE — 99396 PREV VISIT EST AGE 40-64: CPT | Performed by: FAMILY MEDICINE

## 2021-08-10 PROCEDURE — 1036F TOBACCO NON-USER: CPT | Performed by: FAMILY MEDICINE

## 2021-08-10 RX ORDER — VALACYCLOVIR HYDROCHLORIDE 1 G/1
TABLET, FILM COATED ORAL
Qty: 40 TABLET | Refills: 1 | Status: SHIPPED | OUTPATIENT
Start: 2021-08-10 | End: 2022-08-08 | Stop reason: ALTCHOICE

## 2021-08-10 NOTE — PROGRESS NOTES
Assessment/Plan:    No problem-specific Assessment & Plan notes found for this encounter  cpe  htn stable  bmi aware, call for bariatric surgery referral when ready  Gerd/marlo stable  Diet for prediabetes advised, low calorie/carb  HSV labialis stable on prn valtrex  AR stable     Diagnoses and all orders for this visit:    Healthcare maintenance    Benign essential hypertension  -     Comprehensive metabolic panel; Future    Chronic reflux esophagitis    Morbid obesity (HCC)    Prediabetes  -     Comprehensive metabolic panel; Future  -     Hemoglobin A1C; Future    Allergic rhinitis, unspecified seasonality, unspecified trigger    Herpes gingivostomatitis  -     valACYclovir (VALTREX) 1,000 mg tablet; 2 tabs at earliest onset of cold sore, repeat once in 12 hours        Return in about 6 months (around 2/10/2022) for Recheck  Subjective:      Patient ID: Tami Combs is a 51 y o  male  Chief Complaint   Patient presents with    Physical Exam     wmcma       HPI  Been to dietician  Watching calories  Walking for exercise  Feeling better but weight not changing  Pant size smaller per pt  Taking all meds    The following portions of the patient's history were reviewed and updated as appropriate: allergies, current medications, past family history, past medical history, past social history, past surgical history and problem list     Review of Systems   Respiratory: Negative for shortness of breath  Cardiovascular: Negative for chest pain           Current Outpatient Medications   Medication Sig Dispense Refill    albuterol (PROVENTIL HFA) 90 mcg/act inhaler Inhale 2 puffs every 4 (four) hours as needed for wheezing 3 Inhaler 1    Ascorbic Acid (VITAMIN C) 1000 MG tablet Take 1,000 mg by mouth      cetirizine (ZyrTEC) 10 mg tablet Take 1 tablet by mouth daily      diltiazem (CARDIZEM CD) 120 mg 24 hr capsule TAKE 1 CAPSULE DAILY 90 capsule 1    famotidine (PEPCID) 20 mg tablet TAKE 1 TABLET TWICE A DAY  AS NEEDED FOR REFLUX 180 tablet 1    fluticasone (FLONASE) 50 mcg/act nasal spray 2 sprays into each nostril daily 3 Bottle 3    losartan-hydrochlorothiazide (HYZAAR) 100-12 5 MG per tablet TAKE 1 TABLET DAILY 90 tablet 1    montelukast (SINGULAIR) 10 mg tablet TAKE 1 TABLET DAILY 90 tablet 1    Omega-3 1400 MG CAPS Take 1 capsule by mouth daily   propranolol (INDERAL LA) 160 mg TAKE 1 CAPSULE DAILY 90 capsule 1    vitamin E, tocopherol, 400 units capsule Take 400 Units by mouth daily   valACYclovir (VALTREX) 1,000 mg tablet 2 tabs at earliest onset of cold sore, repeat once in 12 hours 40 tablet 1     No current facility-administered medications for this visit  Objective:    /70   Pulse 74   Temp 98 5 °F (36 9 °C)   Resp 16   Ht 5' 8 75" (1 746 m)   Wt (!) 196 kg (432 lb)   SpO2 97%   BMI 64 26 kg/m²        Physical Exam  Vitals and nursing note reviewed  Constitutional:       General: He is not in acute distress  Appearance: He is well-developed  He is not ill-appearing  HENT:      Head: Normocephalic  Right Ear: Tympanic membrane normal       Left Ear: Tympanic membrane normal    Eyes:      General: No scleral icterus  Conjunctiva/sclera: Conjunctivae normal    Cardiovascular:      Rate and Rhythm: Normal rate and regular rhythm  Heart sounds: No murmur heard  Pulmonary:      Effort: Pulmonary effort is normal  No respiratory distress  Breath sounds: No wheezing  Abdominal:      Palpations: Abdomen is soft  Tenderness: There is no abdominal tenderness  Hernia: No hernia is present  Genitourinary:     Penis: Normal        Testes: Normal       Prostate: Normal       Rectum: Normal    Musculoskeletal:         General: No deformity  Cervical back: Neck supple  Right lower leg: No edema  Left lower leg: No edema  Skin:     General: Skin is warm and dry  Neurological:      Mental Status: He is alert        Motor: No weakness  Gait: Gait normal    Psychiatric:         Mood and Affect: Mood normal          Behavior: Behavior normal          Thought Content: Thought content normal          BMI Counseling: Body mass index is 64 26 kg/m²  The BMI is above normal  Nutrition recommendations include decreasing portion sizes and moderation in carbohydrate intake  Exercise recommendations include exercising 3-5 times per week  No pharmacotherapy was ordered                Shilo Sebastian DO

## 2021-08-12 ENCOUNTER — TELEPHONE (OUTPATIENT)
Dept: PULMONOLOGY | Facility: CLINIC | Age: 46
End: 2021-08-12

## 2021-08-12 DIAGNOSIS — G47.33 OBSTRUCTIVE SLEEP APNEA: Primary | ICD-10-CM

## 2021-08-12 NOTE — TELEPHONE ENCOUNTER
Patient called, he stated he would like if you could change back his CPAP setting range  He said its too strong now and uncomfortable for him   Please advise [phone]

## 2021-08-12 NOTE — PROGRESS NOTES
Iraj Monroy called office stating he felt the CPAP pressure was too high  He has new CPAP machine issue 05/04/2021  He has a dream station to auto CPAP machine  Previously he was on set pressure of CPAP of 12  I reviewed data from last month  He is using CPAP and average CPAP pressure was 15  He had residual AHI 5 6 which is good  I will change pressure back to his set pressure of 12 cm of water only intake off auto mode  He can call back in 3-4 weeks let me know how this is working for him and I can review his data at that time  Sent request to his company [employer]     I spoke to his wife and reviewed this with her

## 2021-08-18 DIAGNOSIS — J30.9 ALLERGIC RHINITIS, UNSPECIFIED SEASONALITY, UNSPECIFIED TRIGGER: ICD-10-CM

## 2021-08-18 DIAGNOSIS — I10 BENIGN ESSENTIAL HYPERTENSION: ICD-10-CM

## 2021-08-18 RX ORDER — MONTELUKAST SODIUM 10 MG/1
TABLET ORAL
Qty: 90 TABLET | Refills: 1 | Status: SHIPPED | OUTPATIENT
Start: 2021-08-18 | End: 2022-02-07

## 2021-08-18 RX ORDER — LOSARTAN POTASSIUM AND HYDROCHLOROTHIAZIDE 12.5; 1 MG/1; MG/1
TABLET ORAL
Qty: 90 TABLET | Refills: 1 | Status: SHIPPED | OUTPATIENT
Start: 2021-08-18 | End: 2022-02-07

## 2021-08-18 RX ORDER — PROPRANOLOL HYDROCHLORIDE 160 MG/1
CAPSULE, EXTENDED RELEASE ORAL
Qty: 90 CAPSULE | Refills: 1 | Status: SHIPPED | OUTPATIENT
Start: 2021-08-18 | End: 2022-02-07

## 2021-08-18 RX ORDER — DILTIAZEM HYDROCHLORIDE 120 MG/1
CAPSULE, COATED, EXTENDED RELEASE ORAL
Qty: 90 CAPSULE | Refills: 1 | Status: SHIPPED | OUTPATIENT
Start: 2021-08-18 | End: 2022-02-07

## 2021-10-27 ENCOUNTER — IMMUNIZATIONS (OUTPATIENT)
Dept: FAMILY MEDICINE CLINIC | Facility: CLINIC | Age: 46
End: 2021-10-27
Payer: COMMERCIAL

## 2021-10-27 DIAGNOSIS — Z23 NEED FOR INFLUENZA VACCINATION: Primary | ICD-10-CM

## 2021-10-27 PROCEDURE — 90686 IIV4 VACC NO PRSV 0.5 ML IM: CPT

## 2021-10-27 PROCEDURE — 90471 IMMUNIZATION ADMIN: CPT

## 2021-12-08 ENCOUNTER — OFFICE VISIT (OUTPATIENT)
Dept: GASTROENTEROLOGY | Facility: CLINIC | Age: 46
End: 2021-12-08
Payer: COMMERCIAL

## 2021-12-08 VITALS
HEIGHT: 69 IN | WEIGHT: 315 LBS | SYSTOLIC BLOOD PRESSURE: 119 MMHG | HEART RATE: 75 BPM | BODY MASS INDEX: 46.65 KG/M2 | DIASTOLIC BLOOD PRESSURE: 82 MMHG

## 2021-12-08 DIAGNOSIS — K22.70 BARRETT'S ESOPHAGUS WITHOUT DYSPLASIA: Primary | ICD-10-CM

## 2021-12-08 DIAGNOSIS — K21.00 CHRONIC REFLUX ESOPHAGITIS: ICD-10-CM

## 2021-12-08 DIAGNOSIS — E66.3 OVERWEIGHT: ICD-10-CM

## 2021-12-08 PROBLEM — Z12.11 COLON CANCER SCREENING: Status: ACTIVE | Noted: 2021-12-08

## 2021-12-08 PROCEDURE — 3074F SYST BP LT 130 MM HG: CPT | Performed by: INTERNAL MEDICINE

## 2021-12-08 PROCEDURE — 99213 OFFICE O/P EST LOW 20 MIN: CPT | Performed by: INTERNAL MEDICINE

## 2021-12-08 PROCEDURE — 1036F TOBACCO NON-USER: CPT | Performed by: INTERNAL MEDICINE

## 2021-12-08 PROCEDURE — 3008F BODY MASS INDEX DOCD: CPT | Performed by: INTERNAL MEDICINE

## 2021-12-08 PROCEDURE — 3079F DIAST BP 80-89 MM HG: CPT | Performed by: INTERNAL MEDICINE

## 2021-12-22 DIAGNOSIS — K21.00 CHRONIC REFLUX ESOPHAGITIS: ICD-10-CM

## 2021-12-22 RX ORDER — FAMOTIDINE 20 MG/1
TABLET, FILM COATED ORAL
Qty: 180 TABLET | Refills: 1 | Status: SHIPPED | OUTPATIENT
Start: 2021-12-22 | End: 2022-06-09

## 2021-12-30 ENCOUNTER — TELEPHONE (OUTPATIENT)
Dept: FAMILY MEDICINE CLINIC | Facility: CLINIC | Age: 46
End: 2021-12-30

## 2022-02-03 ENCOUNTER — RA CDI HCC (OUTPATIENT)
Dept: OTHER | Facility: HOSPITAL | Age: 47
End: 2022-02-03

## 2022-02-03 NOTE — PROGRESS NOTES
[address]  coding opportunities          Number of diagnosis code(s) already on the problem list added to FYI fla      E66 01    Appt 2/10/2021               Patients insurance company: [employer]

## 2022-02-07 DIAGNOSIS — J30.9 ALLERGIC RHINITIS, UNSPECIFIED SEASONALITY, UNSPECIFIED TRIGGER: ICD-10-CM

## 2022-02-07 DIAGNOSIS — I10 BENIGN ESSENTIAL HYPERTENSION: ICD-10-CM

## 2022-02-07 RX ORDER — DILTIAZEM HYDROCHLORIDE 120 MG/1
CAPSULE, COATED, EXTENDED RELEASE ORAL
Qty: 90 CAPSULE | Refills: 1 | Status: SHIPPED | OUTPATIENT
Start: 2022-02-07 | End: 2022-08-09

## 2022-02-07 RX ORDER — LOSARTAN POTASSIUM AND HYDROCHLOROTHIAZIDE 12.5; 1 MG/1; MG/1
TABLET ORAL
Qty: 90 TABLET | Refills: 1 | Status: SHIPPED | OUTPATIENT
Start: 2022-02-07 | End: 2022-08-09

## 2022-02-07 RX ORDER — MONTELUKAST SODIUM 10 MG/1
TABLET ORAL
Qty: 90 TABLET | Refills: 1 | Status: SHIPPED | OUTPATIENT
Start: 2022-02-07 | End: 2022-08-09

## 2022-02-07 RX ORDER — PROPRANOLOL HYDROCHLORIDE 160 MG/1
CAPSULE, EXTENDED RELEASE ORAL
Qty: 90 CAPSULE | Refills: 1 | Status: SHIPPED | OUTPATIENT
Start: 2022-02-07 | End: 2022-08-09

## 2022-03-08 LAB
ALBUMIN SERPL-MCNC: 4.4 G/DL (ref 4–5)
ALBUMIN/GLOB SERPL: 1.3 {RATIO} (ref 1.2–2.2)
ALP SERPL-CCNC: 81 IU/L (ref 44–121)
ALT SERPL-CCNC: 17 IU/L (ref 0–44)
AST SERPL-CCNC: 15 IU/L (ref 0–40)
BILIRUB SERPL-MCNC: 0.5 MG/DL (ref 0–1.2)
BUN SERPL-MCNC: 14 MG/DL (ref 6–24)
BUN/CREAT SERPL: 19 (ref 9–20)
CALCIUM SERPL-MCNC: 9.6 MG/DL (ref 8.7–10.2)
CHLORIDE SERPL-SCNC: 99 MMOL/L (ref 96–106)
CO2 SERPL-SCNC: 26 MMOL/L (ref 20–29)
CREAT SERPL-MCNC: 0.74 MG/DL (ref 0.76–1.27)
EGFR: 112 ML/MIN/1.73
GLOBULIN SER-MCNC: 3.3 G/DL (ref 1.5–4.5)
GLUCOSE SERPL-MCNC: 120 MG/DL (ref 65–99)
HBA1C MFR BLD: 6.4 % (ref 4.8–5.6)
MICRODELETION SYND BLD/T FISH: NORMAL
POTASSIUM SERPL-SCNC: 4.1 MMOL/L (ref 3.5–5.2)
PROT SERPL-MCNC: 7.7 G/DL (ref 6–8.5)
SODIUM SERPL-SCNC: 141 MMOL/L (ref 134–144)

## 2022-03-09 ENCOUNTER — OFFICE VISIT (OUTPATIENT)
Dept: FAMILY MEDICINE CLINIC | Facility: CLINIC | Age: 47
End: 2022-03-09
Payer: COMMERCIAL

## 2022-03-09 VITALS
DIASTOLIC BLOOD PRESSURE: 70 MMHG | RESPIRATION RATE: 16 BRPM | SYSTOLIC BLOOD PRESSURE: 130 MMHG | WEIGHT: 315 LBS | HEART RATE: 84 BPM | TEMPERATURE: 97.5 F | HEIGHT: 69 IN | BODY MASS INDEX: 46.65 KG/M2 | OXYGEN SATURATION: 97 %

## 2022-03-09 DIAGNOSIS — I10 BENIGN ESSENTIAL HYPERTENSION: Primary | ICD-10-CM

## 2022-03-09 DIAGNOSIS — Z13.83 SCREENING FOR CARDIOVASCULAR, RESPIRATORY, AND GENITOURINARY DISEASES: ICD-10-CM

## 2022-03-09 DIAGNOSIS — M67.431 GANGLION CYST OF DORSUM OF RIGHT WRIST: ICD-10-CM

## 2022-03-09 DIAGNOSIS — R73.03 PREDIABETES: ICD-10-CM

## 2022-03-09 DIAGNOSIS — Z12.5 PROSTATE CANCER SCREENING: ICD-10-CM

## 2022-03-09 DIAGNOSIS — Z13.89 SCREENING FOR CARDIOVASCULAR, RESPIRATORY, AND GENITOURINARY DISEASES: ICD-10-CM

## 2022-03-09 DIAGNOSIS — Z13.1 SCREENING FOR DIABETES MELLITUS (DM): ICD-10-CM

## 2022-03-09 DIAGNOSIS — E66.01 MORBID OBESITY (HCC): ICD-10-CM

## 2022-03-09 DIAGNOSIS — Z13.6 SCREENING FOR CARDIOVASCULAR, RESPIRATORY, AND GENITOURINARY DISEASES: ICD-10-CM

## 2022-03-09 DIAGNOSIS — L21.9 SEBORRHEIC DERMATITIS: ICD-10-CM

## 2022-03-09 PROCEDURE — 3725F SCREEN DEPRESSION PERFORMED: CPT | Performed by: FAMILY MEDICINE

## 2022-03-09 PROCEDURE — 3008F BODY MASS INDEX DOCD: CPT | Performed by: FAMILY MEDICINE

## 2022-03-09 PROCEDURE — 99214 OFFICE O/P EST MOD 30 MIN: CPT | Performed by: FAMILY MEDICINE

## 2022-03-09 PROCEDURE — 3078F DIAST BP <80 MM HG: CPT | Performed by: FAMILY MEDICINE

## 2022-03-09 PROCEDURE — 3075F SYST BP GE 130 - 139MM HG: CPT | Performed by: FAMILY MEDICINE

## 2022-03-09 PROCEDURE — 1036F TOBACCO NON-USER: CPT | Performed by: FAMILY MEDICINE

## 2022-03-09 RX ORDER — CLOTRIMAZOLE AND BETAMETHASONE DIPROPIONATE 10; .64 MG/G; MG/G
CREAM TOPICAL 2 TIMES DAILY
Qty: 45 G | Refills: 0 | Status: SHIPPED | OUTPATIENT
Start: 2022-03-09

## 2022-03-09 RX ORDER — CHOLECALCIFEROL (VITAMIN D3) 125 MCG
2000 CAPSULE ORAL DAILY
COMMUNITY

## 2022-03-09 RX ORDER — PROPRANOLOL/HYDROCHLOROTHIAZID 40 MG-25MG
TABLET ORAL
COMMUNITY

## 2022-03-09 RX ORDER — ZINC GLUCONATE 50 MG
50 TABLET ORAL DAILY
COMMUNITY

## 2022-03-09 NOTE — PROGRESS NOTES
Assessment/Plan:    No problem-specific Assessment & Plan notes found for this encounter  Prediabetes aware, encouraged wt loss and diet, bariatric referral option    Morbid obesity, bmi aware, has several comorbidities    Seborrheic dermatitis of upper forehead b/l, lotrisone rx, f/u if no better    Right wrist ganglion cyst w/o triggering, could offer ortho/hand referral when ready    htn stable     Diagnoses and all orders for this visit:    Benign essential hypertension  -     Comprehensive metabolic panel; Future    Morbid obesity ([facility] [address] )    Screening for cardiovascular, respiratory, and genitourinary diseases  -     Lipid Panel with Direct LDL reflex; Future    Screening for diabetes mellitus (DM)    Prostate cancer screening  -     PSA, Total Screen; Future    Prediabetes  -     Hemoglobin A1C; Future    Seborrheic dermatitis  -     clotrimazole-betamethasone (LOTRISONE) 1-0 05 % cream; Apply topically 2 (two) times a day Short term, sparingly to area: forehead area    Ganglion cyst of dorsum of right wrist    Other orders  -     zinc gluconate 50 mg tablet; Take 50 mg by mouth daily  -     Cholecalciferol (Vitamin D3) 50 MCG (2000 UT) TABS; Take 2,000 Units by mouth daily  -     Cyanocobalamin (Vitamin B-12) 5000 MCG SUBL; Place under the tongue  -     Turmeric 500 MG CAPS; Take by mouth        Lab Results   Component Value Date    HGBA1C 6 4 (H) 03/07/2022    HGBA1C 6 2 (H) 07/26/2021    HGBA1C 6 1 (H) 07/01/2020     Lab Results   Component Value Date    LDLCALC 130 (H) 07/26/2021    CREATININE 0 74 (L) 03/07/2022       Return in about 6 months (around 9/1/2022) for Annual physical     Subjective:      Patient ID: Minda Holder is a 49 y o  male      Chief Complaint   Patient presents with    Follow-up     6 month f/u-wmcma       HPI  Taking all meds  Labs reviewed  Wt about same  Medical conditions caused and worsened by weight discussed    Recurrent dry patches upper forehead  Felt it has been since covid infection  Moisturizers not helpful    The following portions of the patient's history were reviewed and updated as appropriate: allergies, current medications, past family history, past medical history, past social history, past surgical history and problem list     Review of Systems   Constitutional: Negative for chills and fever  Current Outpatient Medications   Medication Sig Dispense Refill    albuterol (PROVENTIL HFA) 90 mcg/act inhaler Inhale 2 puffs every 4 (four) hours as needed for wheezing 3 Inhaler 1    Ascorbic Acid (VITAMIN C) 1000 MG tablet Take 1,000 mg by mouth      cetirizine (ZyrTEC) 10 mg tablet Take 1 tablet by mouth daily      Cholecalciferol (Vitamin D3) 50 MCG (2000 UT) TABS Take 2,000 Units by mouth daily      Cyanocobalamin (Vitamin B-12) 5000 MCG SUBL Place under the tongue      diltiazem (CARDIZEM CD) 120 mg 24 hr capsule TAKE 1 CAPSULE DAILY 90 capsule 1    famotidine (PEPCID) 20 mg tablet TAKE 1 TABLET TWICE A DAY  AS NEEDED FOR REFLUX 180 tablet 1    fluticasone (FLONASE) 50 mcg/act nasal spray 2 sprays into each nostril daily 3 Bottle 3    losartan-hydrochlorothiazide (HYZAAR) 100-12 5 MG per tablet TAKE 1 TABLET DAILY 90 tablet 1    montelukast (SINGULAIR) 10 mg tablet TAKE 1 TABLET DAILY 90 tablet 1    Omega-3 1400 MG CAPS Take 1 capsule by mouth daily   propranolol (INDERAL LA) 160 mg TAKE 1 CAPSULE DAILY 90 capsule 1    Turmeric 500 MG CAPS Take by mouth      valACYclovir (VALTREX) 1,000 mg tablet 2 tabs at earliest onset of cold sore, repeat once in 12 hours 40 tablet 1    vitamin E, tocopherol, 400 units capsule Take 400 Units by mouth daily   zinc gluconate 50 mg tablet Take 50 mg by mouth daily      clotrimazole-betamethasone (LOTRISONE) 1-0 05 % cream Apply topically 2 (two) times a day Short term, sparingly to area: forehead area 45 g 0     No current facility-administered medications for this visit         Objective:    /70 Pulse 84   Temp 97 5 °F (36 4 °C)   Resp 16   Ht 5' 9" (1 753 m)   Wt (!) 194 kg (427 lb)   SpO2 97%   BMI 63 06 kg/m²        Physical Exam  Vitals and nursing note reviewed  Constitutional:       General: He is not in acute distress  Appearance: He is well-developed  He is not ill-appearing  HENT:      Head: Normocephalic  Right Ear: Tympanic membrane normal       Left Ear: Tympanic membrane normal    Eyes:      General: No scleral icterus  Conjunctiva/sclera: Conjunctivae normal    Cardiovascular:      Rate and Rhythm: Normal rate and regular rhythm  Pulmonary:      Effort: Pulmonary effort is normal  No respiratory distress  Abdominal:      Palpations: Abdomen is soft  Tenderness: There is no abdominal tenderness  Musculoskeletal:         General: No tenderness or deformity  Cervical back: Neck supple  Comments: Right wrist dorsum ganglion cyst   Skin:     General: Skin is warm and dry  Coloration: Skin is not pale  Neurological:      Mental Status: He is alert  Motor: No weakness  Gait: Gait normal    Psychiatric:         Mood and Affect: Mood normal          Behavior: Behavior normal          Thought Content: Thought content normal        BMI Counseling: Body mass index is 63 06 kg/m²  The BMI is above normal  Nutrition recommendations include decreasing portion sizes and moderation in carbohydrate intake  Exercise recommendations include exercising 3-5 times per week  No pharmacotherapy was ordered  Rationale for BMI follow-up plan is due to patient being overweight or obese                Phuong Zepeda DO

## 2022-05-17 ENCOUNTER — TELEPHONE (OUTPATIENT)
Dept: GASTROENTEROLOGY | Facility: AMBULARY SURGERY CENTER | Age: 47
End: 2022-05-17

## 2022-05-17 NOTE — TELEPHONE ENCOUNTER
Patients GI provider:  Dr. Ross    Number to return call: (  [phone]    Reason for call: Pt calling to schedule recall egd, also wants to have a colon as requested by pcp    Scheduled procedure/appointment date if applicable: Apt/procedure  N/A

## 2022-05-17 NOTE — TELEPHONE ENCOUNTER
Ask oa for colon screening / for EGD pt has hx of Maldonado's/Gerd w/ esophagitis.  Will call pt to schedule.

## 2022-05-18 ENCOUNTER — TELEPHONE (OUTPATIENT)
Dept: GASTROENTEROLOGY | Facility: CLINIC | Age: 47
End: 2022-05-18

## 2022-05-18 NOTE — TELEPHONE ENCOUNTER
Scheduled date of EGD/colonoscopy (as of today): 8/8/22  Physician performing EGD/colonoscopy: Dr Kavita Meredith  Location of [facility]/colonoscopy[facility]  Desired bowel prep reviewed with patient:miralax w/ mag * dul   Instructions reviewed with patient by:radhaClearances:  n/a

## 2022-06-09 DIAGNOSIS — K21.00 CHRONIC REFLUX ESOPHAGITIS: ICD-10-CM

## 2022-06-09 RX ORDER — FAMOTIDINE 20 MG/1
TABLET, FILM COATED ORAL
Qty: 180 TABLET | Refills: 1 | Status: SHIPPED | OUTPATIENT
Start: 2022-06-09

## 2022-08-01 ENCOUNTER — TELEPHONE (OUTPATIENT)
Dept: GASTROENTEROLOGY | Facility: CLINIC | Age: 47
End: 2022-08-01

## 2022-08-01 NOTE — TELEPHONE ENCOUNTER
Spoke to pt confirming pt's colonoscopy/EGD scheduled on 8/8/22 with Dr Viv Feliciano at [facility]  Informed [facility] would be calling the day prior with arrival time  Informed pt would need to do a clear liquid diet day prior as well as the bowel cleansing preparation  Informed pt would need a  day of procedure due to being under sedation  Pt did not have any questions  Advised pt to contact insurance company if has any questions regarding coverage of procedures

## 2022-08-06 DIAGNOSIS — J30.9 ALLERGIC RHINITIS, UNSPECIFIED SEASONALITY, UNSPECIFIED TRIGGER: ICD-10-CM

## 2022-08-06 DIAGNOSIS — I10 BENIGN ESSENTIAL HYPERTENSION: ICD-10-CM

## 2022-08-07 RX ORDER — SODIUM CHLORIDE, SODIUM LACTATE, POTASSIUM CHLORIDE, CALCIUM CHLORIDE 600; 310; 30; 20 MG/100ML; MG/100ML; MG/100ML; MG/100ML
75 INJECTION, SOLUTION INTRAVENOUS CONTINUOUS
Status: CANCELLED | OUTPATIENT
Start: 2022-08-07

## 2022-08-08 ENCOUNTER — ANESTHESIA EVENT (OUTPATIENT)
Dept: PERIOP | Facility: HOSPITAL | Age: 47
End: 2022-08-08

## 2022-08-08 ENCOUNTER — ANESTHESIA (OUTPATIENT)
Dept: PERIOP | Facility: HOSPITAL | Age: 47
End: 2022-08-08

## 2022-08-08 ENCOUNTER — HOSPITAL ENCOUNTER (OUTPATIENT)
Dept: PERIOP | Facility: HOSPITAL | Age: 47
Setting detail: OUTPATIENT SURGERY
Discharge: HOME | End: 2022-08-08
Attending: INTERNAL MEDICINE | Admitting: INTERNAL MEDICINE
Payer: COMMERCIAL

## 2022-08-08 VITALS
TEMPERATURE: 98.9 F | RESPIRATION RATE: 11 BRPM | SYSTOLIC BLOOD PRESSURE: 114 MMHG | OXYGEN SATURATION: 93 % | WEIGHT: 315 LBS | BODY MASS INDEX: 45.1 KG/M2 | HEIGHT: 70 IN | DIASTOLIC BLOOD PRESSURE: 60 MMHG | HEART RATE: 79 BPM

## 2022-08-08 DIAGNOSIS — K22.70 BARRETT'S ESOPHAGUS WITHOUT DYSPLASIA: ICD-10-CM

## 2022-08-08 DIAGNOSIS — K21.00 GASTROESOPHAGEAL REFLUX DISEASE WITH ESOPHAGITIS, UNSPECIFIED WHETHER HEMORRHAGE: ICD-10-CM

## 2022-08-08 DIAGNOSIS — Z12.11 COLON CANCER SCREENING: ICD-10-CM

## 2022-08-08 PROCEDURE — 88305 TISSUE EXAM BY PATHOLOGIST: CPT | Performed by: SPECIALIST

## 2022-08-08 PROCEDURE — 45380 COLONOSCOPY AND BIOPSY: CPT | Performed by: INTERNAL MEDICINE

## 2022-08-08 PROCEDURE — 43239 EGD BIOPSY SINGLE/MULTIPLE: CPT | Performed by: INTERNAL MEDICINE

## 2022-08-08 RX ORDER — LIDOCAINE HYDROCHLORIDE 10 MG/ML
INJECTION, SOLUTION EPIDURAL; INFILTRATION; INTRACAUDAL; PERINEURAL AS NEEDED
Status: DISCONTINUED | OUTPATIENT
Start: 2022-08-08 | End: 2022-08-08

## 2022-08-08 RX ORDER — SODIUM CHLORIDE, SODIUM LACTATE, POTASSIUM CHLORIDE, CALCIUM CHLORIDE 600; 310; 30; 20 MG/100ML; MG/100ML; MG/100ML; MG/100ML
75 INJECTION, SOLUTION INTRAVENOUS CONTINUOUS
Status: DISCONTINUED | OUTPATIENT
Start: 2022-08-08 | End: 2022-08-12 | Stop reason: HOSPADM

## 2022-08-08 RX ORDER — PROPOFOL 10 MG/ML
INJECTION, EMULSION INTRAVENOUS AS NEEDED
Status: DISCONTINUED | OUTPATIENT
Start: 2022-08-08 | End: 2022-08-08

## 2022-08-08 RX ADMIN — LIDOCAINE HYDROCHLORIDE 50 MG: 10 INJECTION, SOLUTION EPIDURAL; INFILTRATION; INTRACAUDAL; PERINEURAL at 13:05

## 2022-08-08 RX ADMIN — PROPOFOL 50 MG: 10 INJECTION, EMULSION INTRAVENOUS at 13:05

## 2022-08-08 RX ADMIN — PROPOFOL 50 MG: 10 INJECTION, EMULSION INTRAVENOUS at 13:07

## 2022-08-08 RX ADMIN — SODIUM CHLORIDE, SODIUM LACTATE, POTASSIUM CHLORIDE, AND CALCIUM CHLORIDE: .6; .31; .03; .02 INJECTION, SOLUTION INTRAVENOUS at 12:56

## 2022-08-08 RX ADMIN — PROPOFOL 50 MG: 10 INJECTION, EMULSION INTRAVENOUS at 13:12

## 2022-08-08 RX ADMIN — PROPOFOL 50 MG: 10 INJECTION, EMULSION INTRAVENOUS at 13:22

## 2022-08-08 RX ADMIN — PROPOFOL 50 MG: 10 INJECTION, EMULSION INTRAVENOUS at 13:10

## 2022-08-08 RX ADMIN — PROPOFOL 50 MG: 10 INJECTION, EMULSION INTRAVENOUS at 13:17

## 2022-08-08 RX ADMIN — PROPOFOL 150 MG: 10 INJECTION, EMULSION INTRAVENOUS at 13:02

## 2022-08-08 RX ADMIN — PROPOFOL 50 MG: 10 INJECTION, EMULSION INTRAVENOUS at 13:29

## 2022-08-08 RX ADMIN — PROPOFOL 50 MG: 10 INJECTION, EMULSION INTRAVENOUS at 13:24

## 2022-08-08 RX ADMIN — PROPOFOL 50 MG: 10 INJECTION, EMULSION INTRAVENOUS at 13:19

## 2022-08-08 NOTE — ANESTHESIA PREPROCEDURE EVALUATION
Procedure:  COLONOSCOPY  EGD    Relevant Problems   CARDIO   (+) Benign essential hypertension      MUSCULOSKELETAL   (+) Gout   (+) Localized primary osteoarthritis of lower leg      PULMONARY   (+) Obstructive sleep apnea        Physical Exam    Airway    Mallampati score: II  TM Distance: >3 FB  Neck ROM: full     Dental   No notable dental hx     Cardiovascular  Cardiovascular exam normal    Pulmonary  Pulmonary exam normal     Other Findings        Anesthesia Plan  ASA Score- 3     Anesthesia Type- IV sedation with anesthesia with ASA Monitors  Additional Monitors:   Airway Plan:           Plan Factors-Exercise tolerance (METS): >4 METS  Chart reviewed  Imaging results reviewed  Existing labs reviewed  Patient summary reviewed  Patient is not a current smoker  Obstructive sleep apnea risk education given perioperatively  Induction-     Postoperative Plan-     Informed Consent- Anesthetic plan and risks discussed with patient  I personally reviewed this patient with the CRNA  Discussed and agreed on the Anesthesia Plan with the JALYN Jason

## 2022-08-08 NOTE — H&P
History and Physical - [facility]  Michaela Spence 49 y o  male MRN: [id number]                  HPI: Michaela Spence is a 49y o  year old male who presents for EGD history of reflux, Barretts esophagus and 1st colon cancer screening  REVIEW OF SYSTEMS: Per the HPI, and otherwise unremarkable  Historical Information   Past Medical History:   Diagnosis Date    Acid reflux     Cellulitis     RESOLVED:5/6/15    CPAP (continuous positive airway pressure) dependence     Hypertension     Loose body in knee     RESOLVED:11/15/16    Neoplasm of bone 01/28/2008    LAST ASSESSED: 1/28/08    Patellar tendonitis     UNSPECIFIED LATERALITY, LAST ASSESSED: 10/19/12    Seasonal allergies     Seborrheic dermatitis 01/20/2010    LAST ASSESSED: 1/20/10    Sleep apnea     wears cpap at night    Sprain and strain     LATE EFFECT; RESOLVED: 5/6/15     Past Surgical History:   Procedure Laterality Date    CHOLECYSTECTOMY      FINGER FRACTURE SURGERY Left 1996    pinky    KNEE ARTHROSCOPY Bilateral     left knee growth removed    KNEE SURGERY Right 1992    LIPOMA RESECTION      left side    MO EGD TRANSORAL BIOPSY SINGLE/MULTIPLE N/A 2/22/2016    Procedure: ESOPHAGOGASTRODUODENOSCOPY (EGD); Surgeon: Aileen Quinn MD;  Location: [facility];   Service: Gastroenterology     Social History   Social History     Substance and Sexual Activity   Alcohol Use No     Social History     Substance and Sexual Activity   Drug Use No     Social History     Tobacco Use   Smoking Status Never Smoker   Smokeless Tobacco Never Used     Family History   Problem Relation Age of Onset    Cancer Maternal Grandmother     Breast cancer Mother     Atrial fibrillation Father     Sleep apnea Father     Arthritis Family     Breast cancer Family     Stomach cancer Family     Liver cancer Family        Meds/Allergies       Current Outpatient Medications:     albuterol (PROVENTIL HFA) 90 mcg/act inhaler    cetirizine (ZyrTEC) 10 mg tablet    diltiazem (CARDIZEM CD) 120 mg 24 hr capsule    famotidine (PEPCID) 20 mg tablet    fluticasone (FLONASE) 50 mcg/act nasal spray    losartan-hydrochlorothiazide (HYZAAR) 100-12 5 MG per tablet    montelukast (SINGULAIR) 10 mg tablet    propranolol (INDERAL LA) 160 mg    Ascorbic Acid (VITAMIN C) 1000 MG tablet    Cholecalciferol (Vitamin D3) 50 MCG (2000 UT) TABS    clotrimazole-betamethasone (LOTRISONE) 1-0 05 % cream    Cyanocobalamin (Vitamin B-12) 5000 MCG SUBL    Omega-3 1400 MG CAPS    Turmeric 500 MG CAPS    vitamin E, tocopherol, 400 units capsule    zinc gluconate 50 mg tablet    Current Facility-Administered Medications:     lactated ringers infusion, 75 mL/hr, Intravenous, Continuous    No Known Allergies    Objective     /65   Pulse 74   Temp 98 °F (36 7 °C) (Temporal)   Resp 18   Ht 5' 10" (1 778 m)   Wt (!) 188 kg (414 lb)   SpO2 91%   BMI 59 40 kg/m²       PHYSICAL EXAM    Gen: NAD  Head: NCAT  CV: RRR  CHEST: Clear  ABD: soft, NT/ND  EXT: no edema      ASSESSMENT/PLAN:  This is a 49y o  year old male here for EGD and colonoscopy, and he is stable and optimized for his procedure

## 2022-08-08 NOTE — ANESTHESIA POSTPROCEDURE EVALUATION
Post-Op Assessment Note    CV Status:  Stable  Pain Score: 0    Pain management: adequate     Mental Status:  Alert and awake   Hydration Status:  Euvolemic   PONV Controlled:  Controlled   Airway Patency:  Patent      Post Op Vitals Reviewed: Yes      Staff: Anesthesiologist         No complications documented      BP   117/58   Temp      Pulse  82   Resp   20   SpO2   96

## 2022-08-09 RX ORDER — MONTELUKAST SODIUM 10 MG/1
TABLET ORAL
Qty: 90 TABLET | Refills: 1 | Status: SHIPPED | OUTPATIENT
Start: 2022-08-09

## 2022-08-09 RX ORDER — PROPRANOLOL HYDROCHLORIDE 160 MG/1
CAPSULE, EXTENDED RELEASE ORAL
Qty: 90 CAPSULE | Refills: 1 | Status: SHIPPED | OUTPATIENT
Start: 2022-08-09

## 2022-08-09 RX ORDER — LOSARTAN POTASSIUM AND HYDROCHLOROTHIAZIDE 12.5; 1 MG/1; MG/1
TABLET ORAL
Qty: 90 TABLET | Refills: 1 | Status: SHIPPED | OUTPATIENT
Start: 2022-08-09

## 2022-08-09 RX ORDER — DILTIAZEM HYDROCHLORIDE 120 MG/1
CAPSULE, COATED, EXTENDED RELEASE ORAL
Qty: 90 CAPSULE | Refills: 1 | Status: SHIPPED | OUTPATIENT
Start: 2022-08-09

## 2022-08-17 NOTE — RESULT ENCOUNTER NOTE
Patient was informed via my chart biopsies were benign  Early Barretts noted repeat EGD 3 years, colon polyp was a sessile serrated adenoma instead of 7 years repeat colonoscopy in 5 years

## 2022-09-07 LAB
ALBUMIN SERPL-MCNC: 4.4 G/DL (ref 4–5)
ALBUMIN/GLOB SERPL: 1.5 {RATIO} (ref 1.2–2.2)
ALP SERPL-CCNC: 80 IU/L (ref 44–121)
ALT SERPL-CCNC: 21 IU/L (ref 0–44)
AST SERPL-CCNC: 18 IU/L (ref 0–40)
BILIRUB SERPL-MCNC: 0.6 MG/DL (ref 0–1.2)
BUN SERPL-MCNC: 15 MG/DL (ref 6–24)
BUN/CREAT SERPL: 19 (ref 9–20)
CALCIUM SERPL-MCNC: 9.6 MG/DL (ref 8.7–10.2)
CHLORIDE SERPL-SCNC: 98 MMOL/L (ref 96–106)
CHOLEST SERPL-MCNC: 203 MG/DL (ref 100–199)
CO2 SERPL-SCNC: 26 MMOL/L (ref 20–29)
CREAT SERPL-MCNC: 0.8 MG/DL (ref 0.76–1.27)
EGFR: 109 ML/MIN/1.73
GLOBULIN SER-MCNC: 2.9 G/DL (ref 1.5–4.5)
GLUCOSE SERPL-MCNC: 111 MG/DL (ref 65–99)
HBA1C MFR BLD: 6.4 % (ref 4.8–5.6)
HDLC SERPL-MCNC: 42 MG/DL
LDLC SERPL CALC-MCNC: 139 MG/DL (ref 0–99)
MICRODELETION SYND BLD/T FISH: NORMAL
POTASSIUM SERPL-SCNC: 4.1 MMOL/L (ref 3.5–5.2)
PROT SERPL-MCNC: 7.3 G/DL (ref 6–8.5)
PSA SERPL-MCNC: 1.4 NG/ML (ref 0–4)
SODIUM SERPL-SCNC: 141 MMOL/L (ref 134–144)
TRIGL SERPL-MCNC: 122 MG/DL (ref 0–149)

## 2022-09-20 ENCOUNTER — OFFICE VISIT (OUTPATIENT)
Dept: FAMILY MEDICINE CLINIC | Facility: CLINIC | Age: 47
End: 2022-09-20
Payer: COMMERCIAL

## 2022-09-20 VITALS
DIASTOLIC BLOOD PRESSURE: 76 MMHG | OXYGEN SATURATION: 96 % | TEMPERATURE: 97.5 F | WEIGHT: 315 LBS | RESPIRATION RATE: 16 BRPM | HEART RATE: 63 BPM | SYSTOLIC BLOOD PRESSURE: 124 MMHG | HEIGHT: 70 IN | BODY MASS INDEX: 45.1 KG/M2

## 2022-09-20 DIAGNOSIS — R73.03 PREDIABETES: ICD-10-CM

## 2022-09-20 DIAGNOSIS — E66.01 MORBID OBESITY (HCC): ICD-10-CM

## 2022-09-20 DIAGNOSIS — Z91.89 FRAMINGHAM CARDIAC RISK <10% IN NEXT 10 YEARS: ICD-10-CM

## 2022-09-20 DIAGNOSIS — Z00.00 HEALTHCARE MAINTENANCE: Primary | ICD-10-CM

## 2022-09-20 DIAGNOSIS — I10 BENIGN ESSENTIAL HYPERTENSION: ICD-10-CM

## 2022-09-20 DIAGNOSIS — Z23 IMMUNIZATION DUE: ICD-10-CM

## 2022-09-20 PROCEDURE — 90686 IIV4 VACC NO PRSV 0.5 ML IM: CPT

## 2022-09-20 PROCEDURE — 3725F SCREEN DEPRESSION PERFORMED: CPT | Performed by: FAMILY MEDICINE

## 2022-09-20 PROCEDURE — 3074F SYST BP LT 130 MM HG: CPT | Performed by: FAMILY MEDICINE

## 2022-09-20 PROCEDURE — 99396 PREV VISIT EST AGE 40-64: CPT | Performed by: FAMILY MEDICINE

## 2022-09-20 PROCEDURE — 90471 IMMUNIZATION ADMIN: CPT

## 2022-09-20 PROCEDURE — 3078F DIAST BP <80 MM HG: CPT | Performed by: FAMILY MEDICINE

## 2022-09-20 NOTE — PROGRESS NOTES
Assessment/Plan:    No problem-specific Assessment & Plan notes found for this encounter  cpe    htn stable on meds, f/u q6m    fram score ok at present at 3 9%    Morbid obesity with htn and SAGAR and prediabetes and osteoarthritis, would benefit from bariatric surgical options, nutrition referral given in past but not covered, long term risks aware    Prediabetes aware, try more exercise, is reasonable about carbs       Diagnoses and all orders for this visit:    Healthcare maintenance    Benign essential hypertension  -     Comprehensive metabolic panel; Future    BMI 60 0-69 9, adult [facility])  -     Ambulatory referral to Bariatric Surgery; Future    Immunization due  -     influenza vaccine, quadrivalent, 0 5 mL, preservative-free, for adult and pediatric patients 6 mos+ (AFLURIA, FLUARIX, FLULAVAL, FLUZONE)    Prediabetes  -     Comprehensive metabolic panel; Future  -     Hemoglobin A1C; Future    Morbid obesity ([facility] [address] )  -     Ambulatory referral to Bariatric Surgery; Future    [place] cardiac risk <10% in next 10 years        Return in about 6 months (around 3/20/2023) for Recheck  Subjective:      Patient ID: Arnaud Rivera is a 49 y o  male  Chief Complaint   Patient presents with    Physical Exam     wmcma       HPI  Still has CDL  Gets from [employer] yearly due to htn  Prediabetic still  Avoids sugar  Has bread, infrequent, rye bread    The following portions of the patient's history were reviewed and updated as appropriate: allergies, current medications, past family history, past medical history, past social history, past surgical history and problem list     Review of Systems   Constitutional: Negative for fever  Respiratory: Negative for shortness of breath            Current Outpatient Medications   Medication Sig Dispense Refill    albuterol (PROVENTIL HFA) 90 mcg/act inhaler Inhale 2 puffs every 4 (four) hours as needed for wheezing 3 Inhaler 1    Ascorbic Acid (VITAMIN C) 1000 MG tablet Take 1,000 mg by mouth      cetirizine (ZyrTEC) 10 mg tablet Take 1 tablet by mouth daily      Cholecalciferol (Vitamin D3) 50 MCG (2000 UT) TABS Take 2,000 Units by mouth daily      Cyanocobalamin (Vitamin B-12) 5000 MCG SUBL Place under the tongue      diltiazem (CARDIZEM CD) 120 mg 24 hr capsule TAKE 1 CAPSULE DAILY 90 capsule 1    famotidine (PEPCID) 20 mg tablet TAKE 1 TABLET TWICE A DAY  AS NEEDED FOR REFLUX (Patient taking differently: 2 (two) times a day) 180 tablet 1    fluticasone (FLONASE) 50 mcg/act nasal spray 2 sprays into each nostril daily (Patient taking differently: 2 sprays into each nostril as needed) 3 Bottle 3    losartan-hydrochlorothiazide (HYZAAR) 100-12 5 MG per tablet TAKE 1 TABLET DAILY 90 tablet 1    montelukast (SINGULAIR) 10 mg tablet TAKE 1 TABLET DAILY 90 tablet 1    Omega-3 1400 MG CAPS Take 1 capsule by mouth daily   propranolol (INDERAL LA) 160 mg TAKE 1 CAPSULE DAILY 90 capsule 1    Turmeric 500 MG CAPS Take by mouth      vitamin E, tocopherol, 400 units capsule Take 400 Units by mouth daily   zinc gluconate 50 mg tablet Take 50 mg by mouth daily      clotrimazole-betamethasone (LOTRISONE) 1-0 05 % cream Apply topically 2 (two) times a day Short term, sparingly to area: forehead area (Patient not taking: Reported on 9/20/2022) 45 g 0     No current facility-administered medications for this visit  Objective:    /76   Pulse 63   Temp 97 5 °F (36 4 °C)   Resp 16   Ht 5' 10" (1 778 m)   Wt (!) 190 kg (419 lb)   SpO2 96%   BMI 60 12 kg/m²        Physical Exam  Vitals and nursing note reviewed  Constitutional:       Appearance: He is well-developed  He is obese  He is not ill-appearing  HENT:      Head: Normocephalic  Right Ear: Tympanic membrane normal       Left Ear: Tympanic membrane normal    Eyes:      General: No scleral icterus       Conjunctiva/sclera: Conjunctivae normal    Cardiovascular:      Rate and Rhythm: Normal rate and regular rhythm  Heart sounds: No murmur heard  Pulmonary:      Effort: Pulmonary effort is normal  No respiratory distress  Breath sounds: No wheezing  Abdominal:      Palpations: Abdomen is soft  Tenderness: There is no abdominal tenderness  Hernia: No hernia is present  Genitourinary:     Penis: Normal        Testes: Normal       Prostate: Normal       Rectum: Normal    Musculoskeletal:         General: No deformity  Cervical back: Neck supple  Right lower leg: No edema  Left lower leg: No edema  Skin:     General: Skin is warm and dry  Coloration: Skin is not pale  Neurological:      Mental Status: He is alert  Motor: No weakness  Gait: Gait normal    Psychiatric:         Mood and Affect: Mood normal          Behavior: Behavior normal          Thought Content: Thought content normal        BMI Counseling: Body mass index is 60 12 kg/m²  The BMI is above normal  Nutrition recommendations include decreasing portion sizes and moderation in carbohydrate intake  Exercise recommendations include exercising 3-5 times per week  No pharmacotherapy was ordered  Rationale for BMI follow-up plan is due to patient being overweight or obese  Depression Screening and Follow-up Plan: Patient was screened for depression during today's encounter  They screened negative with a PHQ-2 score of 0               Severo Cruz DO

## 2022-09-21 ENCOUNTER — OFFICE VISIT (OUTPATIENT)
Dept: PULMONOLOGY | Facility: MEDICAL CENTER | Age: 47
End: 2022-09-21
Payer: COMMERCIAL

## 2022-09-21 VITALS
HEIGHT: 70 IN | TEMPERATURE: 98.6 F | HEART RATE: 73 BPM | SYSTOLIC BLOOD PRESSURE: 122 MMHG | RESPIRATION RATE: 12 BRPM | OXYGEN SATURATION: 96 % | BODY MASS INDEX: 45.1 KG/M2 | WEIGHT: 315 LBS | DIASTOLIC BLOOD PRESSURE: 68 MMHG

## 2022-09-21 DIAGNOSIS — G47.33 OBSTRUCTIVE SLEEP APNEA: Primary | ICD-10-CM

## 2022-09-21 DIAGNOSIS — E66.01 CLASS 3 SEVERE OBESITY DUE TO EXCESS CALORIES WITH SERIOUS COMORBIDITY AND BODY MASS INDEX (BMI) OF 50.0 TO 59.9 IN ADULT (HCC): ICD-10-CM

## 2022-09-21 DIAGNOSIS — I10 BENIGN ESSENTIAL HYPERTENSION: ICD-10-CM

## 2022-09-21 LAB
DME PARACHUTE DELIVERY DATE ACTUAL: NORMAL
DME PARACHUTE DELIVERY DATE REQUESTED: NORMAL
DME PARACHUTE ITEM DESCRIPTION: NORMAL
DME PARACHUTE ORDER STATUS: NORMAL
DME PARACHUTE SUPPLIER NAME: NORMAL
DME PARACHUTE SUPPLIER PHONE: NORMAL

## 2022-09-21 PROCEDURE — 99214 OFFICE O/P EST MOD 30 MIN: CPT | Performed by: INTERNAL MEDICINE

## 2022-09-21 NOTE — PROGRESS NOTES
Assessment/Plan        Problem List Items Addressed This Visit        Respiratory    Obstructive sleep apnea - Primary     Severe SAGAR with good compliance and benefit from use of CPAP  Will continue CPAP and I did adjust pressure range to 11-16 centimeters water  His DME company is [employer]   Does not have any excessive daytime somnolence or fatigue  No drowsiness when he tries  He has been compliant with use of his CPAP  I did review compliance data for past 1 month and he used to CPAP on regular basis and present setting is 12 to 15 cm H20  This resulted in AHI of 5 1 which is satisfactory  His average CPAP pressure was 15  I did adjust his new range of pressure to 11 to 16 cm water         Relevant Orders    PAP DME Pressure Change       Cardiovascular and Mediastinum    Benign essential hypertension     He is on multiple medication for his blood pressure including losartan-hydrochlorothiazide, propranolol 160 mg daily and diltiazem 120 mg daily  Blood pressure is normal today  Other    Class 3 severe obesity due to excess calories with body mass index (BMI) of 50 0 to 59 9 in [facility])     I did discuss weight loss with him and that this would benefit his health and SAGAR  Did recommend he try to watch his carbohydrate and reduce intake of this  Sleep Apnea      HPI     Jona Lundberg presents for follow-up visit for his obstructive sleep apnea  He has severe SAGAR as evidence by initial diagnostic sleep study March of 2003  Overall AHI at that time was 112 with oxygen mor of 73%  He did get a new CPAP machine on May 4, 2021 from [facility]   CPAP is on auto mode set at pressure of 11-15 cm water and uses nasal pillow interface  He is doing well with his machine  Does not have any excessive fatigue or somnolence  Does not get short of breath when he sleeps  Jona Lundberg does work night shift usually 7:00 p m  to 7:00 a m  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  He usually dries mostly in [place] but sometimes will go to [place]  Does not have any sleepiness when driving  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    He does have hypertension  Not having any shortness of breath with activity  Past Medical History:   Diagnosis Date    Acid reflux     Cellulitis     RESOLVED:5/6/15    CPAP (continuous positive airway pressure) dependence     Hypertension     Loose body in knee     RESOLVED:11/15/16    Neoplasm of bone 01/28/2008    LAST ASSESSED: 1/28/08    Patellar tendonitis     UNSPECIFIED LATERALITY, LAST ASSESSED: 10/19/12    Seasonal allergies     Seborrheic dermatitis 01/20/2010    LAST ASSESSED: 1/20/10    Sleep apnea     wears cpap at night    Sprain and strain     LATE EFFECT; RESOLVED: 5/6/15       Past Surgical History:   Procedure Laterality Date    CHOLECYSTECTOMY      FINGER FRACTURE SURGERY Left 1996    pinky    KNEE ARTHROSCOPY Bilateral     left knee growth removed    KNEE SURGERY Right 1992    LIPOMA RESECTION      left side    CT EGD TRANSORAL BIOPSY SINGLE/MULTIPLE N/A 2/22/2016    Procedure: ESOPHAGOGASTRODUODENOSCOPY (EGD); Surgeon: Gopal Brothers MD;  Location: [facility];   Service: Gastroenterology         Current Outpatient Medications:     albuterol (PROVENTIL HFA) 90 mcg/act inhaler, Inhale 2 puffs every 4 (four) hours as needed for wheezing, Disp: 3 Inhaler, Rfl: 1    Ascorbic Acid (VITAMIN C) 1000 MG tablet, Take 1,000 mg by mouth, Disp: , Rfl:     cetirizine (ZyrTEC) 10 mg tablet, Take 1 tablet by mouth daily, Disp: , Rfl:     Cholecalciferol (Vitamin D3) 50 MCG (2000 UT) TABS, Take 2,000 Units by mouth daily, Disp: , Rfl:     Cyanocobalamin (Vitamin B-12) 5000 MCG SUBL, Place under the tongue, Disp: , Rfl:     diltiazem (CARDIZEM CD) 120 mg 24 hr capsule, TAKE 1 CAPSULE DAILY, Disp: 90 capsule, Rfl: 1    famotidine (PEPCID) 20 mg tablet, TAKE 1 TABLET TWICE A DAY  AS NEEDED FOR REFLUX (Patient taking differently: 2 (two) times a day), Disp: 180 tablet, Rfl: 1    losartan-hydrochlorothiazide (HYZAAR) 100-12 5 MG per tablet, TAKE 1 TABLET DAILY, Disp: 90 tablet, Rfl: 1    montelukast (SINGULAIR) 10 mg tablet, TAKE 1 TABLET DAILY, Disp: 90 tablet, Rfl: 1    Omega-3 1400 MG CAPS, Take 1 capsule by mouth daily  , Disp: , Rfl:     propranolol (INDERAL LA) 160 mg, TAKE 1 CAPSULE DAILY, Disp: 90 capsule, Rfl: 1    Turmeric 500 MG CAPS, Take by mouth, Disp: , Rfl:     vitamin E, tocopherol, 400 units capsule, Take 400 Units by mouth daily  , Disp: , Rfl:     zinc gluconate 50 mg tablet, Take 50 mg by mouth daily, Disp: , Rfl:     clotrimazole-betamethasone (LOTRISONE) 1-0 05 % cream, Apply topically 2 (two) times a day Short term, sparingly to area: forehead area (Patient not taking: No sig reported), Disp: 45 g, Rfl: 0    fluticasone (FLONASE) 50 mcg/act nasal spray, 2 sprays into each nostril daily (Patient not taking: No sig reported), Disp: 3 Bottle, Rfl: 3    No Known Allergies    Social History     Tobacco Use    Smoking status: Never Smoker    Smokeless tobacco: Never Used   Substance Use Topics    Alcohol use: No         Family History   Problem Relation Age of Onset    Cancer Maternal Grandmother     Breast cancer Mother     Atrial fibrillation Father     Sleep apnea Father     Arthritis Family     Breast cancer Family     Stomach cancer Family     Liver cancer Family        Review of Systems   Constitutional: Negative for appetite change and fever  HENT: Negative for ear pain, postnasal drip, rhinorrhea, sneezing, sore throat and trouble swallowing  Eyes: Negative for photophobia and redness  Respiratory: Negative for shortness of breath  Cardiovascular: Negative for chest pain  Gastrointestinal: Positive for abdominal distention  Genitourinary: Negative for dysuria  Musculoskeletal: Negative for myalgias  Neurological: Negative for headaches  Psychiatric/Behavioral: Negative for decreased concentration  Vitals:    09/21/22 1006   BP: 122/68   Pulse: 73   Resp: 12   Temp: 98 6 °F (37 °C)   SpO2: 96%     Height: 5' 10" (177 8 cm)  IBW (Ideal Body Weight): 73 kg  Body mass index is 59 98 kg/m²  Weight (last 2 days)     None              Physical Exam  Vitals reviewed  Constitutional:       General: He is not in acute distress  Appearance: Normal appearance  He is well-developed  He is obese  HENT:      Head: Normocephalic  Right Ear: External ear normal       Left Ear: External ear normal       Nose: Nose normal       Mouth/Throat:      Mouth: Mucous membranes are moist       Pharynx: Oropharynx is clear  No oropharyngeal exudate  Comments: Mallampati score is 3  Eyes:      Conjunctiva/sclera: Conjunctivae normal       Pupils: Pupils are equal, round, and reactive to light  Cardiovascular:      Rate and Rhythm: Normal rate and regular rhythm  Heart sounds: Normal heart sounds  Pulmonary:      Effort: Pulmonary effort is normal       Comments: Lung sounds are clear  No wheezes, crackles or rhonchi  Abdominal:      General: There is no distension  Palpations: Abdomen is soft  Tenderness: There is no abdominal tenderness  Musculoskeletal:      Cervical back: Neck supple  Comments: No edema, cyanosis or clubbing   Lymphadenopathy:      Cervical: No cervical adenopathy  Skin:     General: Skin is warm and dry  Neurological:      General: No focal deficit present  Mental Status: He is alert and oriented to person, place, and time     Psychiatric:         Mood and Affect: Mood normal          Behavior: Behavior normal                   Answers for HPI/ROS submitted by the patient on 9/20/2022  Do you have shortness of breath that occurs with effort or exertion?: No  Do you have ear congestion?: No  Do you have heartburn?: No  Do you have fatigue?: No  Do you have nasal congestion?: No  Do you have shortness of breath when lying flat?: No  Do you have shortness of breath when you wake up?: No  Do you have sweats?: No  Have you experienced weight loss?: No  Which of the following makes your symptoms worse?: nothing  Which of the following makes your symptoms better?: nothing

## 2022-09-21 NOTE — PATIENT INSTRUCTIONS
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

## 2022-10-02 NOTE — ASSESSMENT & PLAN NOTE
Severe SAGAR with good compliance and benefit from use of CPAP  Will continue CPAP and I did adjust pressure range to 11-16 centimeters water  His alike company is [employer]   Does not have any excessive daytime somnolence or fatigue  No drowsiness when he tries  He has been compliant with use of his CPAP  I did review compliance data for past 1 month and he used to CPAP on regular basis and present setting is 12 to 15 cm H20  This resulted in AHI of 5 1 which is satisfactory  His average CPAP pressure was 15    I did adjust his new range of pressure to 11 to 16 cm water

## 2022-10-02 NOTE — ASSESSMENT & PLAN NOTE
COPD EDUCATION by COPD CLINICAL EDUCATOR  7/12/2019 at 6:06 AM by Cinthia Ortega     Patient's chart reviewed by COPD education team. Patient does not have an order for Respiratory Care Protocol, therefore the COPD education team cannot treat.   He is on multiple medication for his blood pressure including losartan-hydrochlorothiazide, propranolol 160 mg daily and diltiazem 120 mg daily  Blood pressure is normal today

## 2022-10-02 NOTE — ASSESSMENT & PLAN NOTE
I did discuss weight loss with him and that this would benefit his health and SAGAR  Did recommend he try to watch his carbohydrate and reduce intake of this

## 2022-10-11 PROBLEM — Z13.1 SCREENING FOR DIABETES MELLITUS (DM): Status: RESOLVED | Noted: 2022-03-09 | Resolved: 2022-10-11

## 2022-10-12 PROBLEM — Z12.11 COLON CANCER SCREENING: Status: RESOLVED | Noted: 2021-12-08 | Resolved: 2022-10-12

## 2022-12-10 DIAGNOSIS — K21.00 CHRONIC REFLUX ESOPHAGITIS: ICD-10-CM

## 2022-12-10 RX ORDER — FAMOTIDINE 20 MG/1
TABLET, FILM COATED ORAL
Qty: 180 TABLET | Refills: 1 | Status: SHIPPED | OUTPATIENT
Start: 2022-12-10

## 2023-01-29 DIAGNOSIS — J30.9 ALLERGIC RHINITIS, UNSPECIFIED SEASONALITY, UNSPECIFIED TRIGGER: ICD-10-CM

## 2023-01-29 DIAGNOSIS — I10 BENIGN ESSENTIAL HYPERTENSION: ICD-10-CM

## 2023-01-30 RX ORDER — MONTELUKAST SODIUM 10 MG/1
TABLET ORAL
Qty: 90 TABLET | Refills: 1 | Status: SHIPPED | OUTPATIENT
Start: 2023-01-30

## 2023-01-30 RX ORDER — DILTIAZEM HYDROCHLORIDE 120 MG/1
CAPSULE, COATED, EXTENDED RELEASE ORAL
Qty: 90 CAPSULE | Refills: 1 | Status: SHIPPED | OUTPATIENT
Start: 2023-01-30

## 2023-01-30 RX ORDER — LOSARTAN POTASSIUM AND HYDROCHLOROTHIAZIDE 12.5; 1 MG/1; MG/1
TABLET ORAL
Qty: 90 TABLET | Refills: 1 | Status: SHIPPED | OUTPATIENT
Start: 2023-01-30

## 2023-01-30 RX ORDER — PROPRANOLOL HYDROCHLORIDE 160 MG/1
CAPSULE, EXTENDED RELEASE ORAL
Qty: 90 CAPSULE | Refills: 1 | Status: SHIPPED | OUTPATIENT
Start: 2023-01-30

## 2023-02-07 ENCOUNTER — OFFICE VISIT (OUTPATIENT)
Dept: FAMILY MEDICINE CLINIC | Facility: CLINIC | Age: 48
End: 2023-02-07

## 2023-02-07 VITALS
TEMPERATURE: 97 F | SYSTOLIC BLOOD PRESSURE: 122 MMHG | OXYGEN SATURATION: 95 % | WEIGHT: 315 LBS | RESPIRATION RATE: 18 BRPM | BODY MASS INDEX: 45.1 KG/M2 | DIASTOLIC BLOOD PRESSURE: 70 MMHG | HEART RATE: 76 BPM | HEIGHT: 70 IN

## 2023-02-07 DIAGNOSIS — I10 BENIGN ESSENTIAL HYPERTENSION: ICD-10-CM

## 2023-02-07 DIAGNOSIS — G47.33 OBSTRUCTIVE SLEEP APNEA: ICD-10-CM

## 2023-02-07 DIAGNOSIS — J01.00 ACUTE NON-RECURRENT MAXILLARY SINUSITIS: Primary | ICD-10-CM

## 2023-02-07 DIAGNOSIS — J31.0 CHRONIC RHINITIS: ICD-10-CM

## 2023-02-07 DIAGNOSIS — E66.01 MORBID OBESITY (HCC): ICD-10-CM

## 2023-02-07 RX ORDER — METHYLPREDNISOLONE 4 MG/1
TABLET ORAL
Qty: 21 TABLET | Refills: 0 | Status: SHIPPED | OUTPATIENT
Start: 2023-02-07 | End: 2023-02-13

## 2023-02-07 RX ORDER — CEFUROXIME AXETIL 500 MG/1
500 TABLET ORAL 2 TIMES DAILY
Qty: 20 TABLET | Refills: 0 | Status: SHIPPED | OUTPATIENT
Start: 2023-02-07 | End: 2023-02-17

## 2023-02-07 NOTE — PROGRESS NOTES
Assessment/Plan:    No problem-specific Assessment & Plan notes found for this encounter  Sinusitis  Steroid risks aware  mucinex dm  abx if no better    htn stable  AR stable    bmi aware  Encourage wt loss and wt mgmt but consider bariatric referral if interested and covered     Diagnoses and all orders for this visit:    Acute non-recurrent maxillary sinusitis  -     methylPREDNISolone 4 MG tablet therapy pack; Use as directed on package  -     cefuroxime (CEFTIN) 500 mg tablet; Take 1 tablet (500 mg total) by mouth 2 (two) times a day for 10 days    Benign essential hypertension    Chronic rhinitis    BMI 60 0-69 9, adult (HCC)    Morbid obesity ([facility] [address] )    Obstructive sleep apnea        Return if symptoms worsen or fail to improve  Subjective:      Patient ID: Inez Lopez is a 49 y o  male  Chief Complaint   Patient presents with   • Sinus Problem     Onset: 2 days  Patient tested at [facility] for covid and it was negative  Yun Trevino CMA    • Sore Throat   • Cough   • Ear Fullness     Pressure and patient feels off balance  klcma   • Headache   • Nasal Congestion   • Chills   • Night Sweats       HPI  Going to wt mgmt  Uri sx  2d or longer  Chest congestion worsening  Feverish and chills last pm  Headache  Cough  No sob  np cough  Sore throat  mucinex DM    The following portions of the patient's history were reviewed and updated as appropriate: allergies, current medications, past family history, past medical history, past social history, past surgical history and problem list     Review of Systems   Respiratory: Negative for wheezing  Cardiovascular: Negative for chest pain           Current Outpatient Medications   Medication Sig Dispense Refill   • albuterol (PROVENTIL HFA) 90 mcg/act inhaler Inhale 2 puffs every 4 (four) hours as needed for wheezing 3 Inhaler 1   • Ascorbic Acid (VITAMIN C) 1000 MG tablet Take 1,000 mg by mouth     • cefuroxime (CEFTIN) 500 mg tablet Take 1 tablet (500 mg total) by mouth 2 (two) times a day for 10 days 20 tablet 0   • cetirizine (ZyrTEC) 10 mg tablet Take 1 tablet by mouth daily     • Cholecalciferol (Vitamin D3) 50 MCG (2000 UT) TABS Take 2,000 Units by mouth daily     • clotrimazole-betamethasone (LOTRISONE) 1-0 05 % cream Apply topically 2 (two) times a day Short term, sparingly to area: forehead area 45 g 0   • Cyanocobalamin (Vitamin B-12) 5000 MCG SUBL Place under the tongue     • diltiazem (CARDIZEM CD) 120 mg 24 hr capsule TAKE 1 CAPSULE DAILY 90 capsule 1   • famotidine (PEPCID) 20 mg tablet TAKE 1 TABLET TWICE A DAY  AS NEEDED FOR REFLUX 180 tablet 1   • fluticasone (FLONASE) 50 mcg/act nasal spray 2 sprays into each nostril daily 3 Bottle 3   • losartan-hydrochlorothiazide (HYZAAR) 100-12 5 MG per tablet TAKE 1 TABLET DAILY 90 tablet 1   • methylPREDNISolone 4 MG tablet therapy pack Use as directed on package 21 tablet 0   • montelukast (SINGULAIR) 10 mg tablet TAKE 1 TABLET DAILY 90 tablet 1   • Omega-3 1400 MG CAPS Take 1 capsule by mouth daily  • propranolol (INDERAL LA) 160 mg TAKE 1 CAPSULE DAILY 90 capsule 1   • Turmeric 500 MG CAPS Take by mouth     • vitamin E, tocopherol, 400 units capsule Take 400 Units by mouth daily  • zinc gluconate 50 mg tablet Take 50 mg by mouth daily       No current facility-administered medications for this visit  Objective:    /70   Pulse 76   Temp (!) 97 °F (36 1 °C)   Resp 18   Ht 5' 10" (1 778 m)   Wt (!) 194 kg (428 lb)   SpO2 95%   BMI 61 41 kg/m²        Physical Exam  Vitals and nursing note reviewed  Constitutional:       General: He is not in acute distress  Appearance: He is well-developed  He is obese  He is not ill-appearing  HENT:      Head: Normocephalic  Right Ear: Tympanic membrane normal       Left Ear: Tympanic membrane normal       Nose: Congestion present  Eyes:      General: No scleral icterus       Conjunctiva/sclera: Conjunctivae normal    Cardiovascular: Rate and Rhythm: Normal rate and regular rhythm  Pulmonary:      Effort: Pulmonary effort is normal  No respiratory distress  Breath sounds: No wheezing  Abdominal:      Palpations: Abdomen is soft  Musculoskeletal:         General: No deformity  Cervical back: Neck supple  Skin:     General: Skin is warm and dry  Coloration: Skin is not pale  Neurological:      Mental Status: He is alert  Motor: No weakness  Gait: Gait normal    Psychiatric:         Mood and Affect: Mood normal          Behavior: Behavior normal          Thought Content:  Thought content normal                 Swapnil Kim DO

## 2023-02-22 ENCOUNTER — CONSULT (OUTPATIENT)
Dept: BARIATRICS | Facility: CLINIC | Age: 48
End: 2023-02-22

## 2023-02-22 VITALS
HEART RATE: 83 BPM | DIASTOLIC BLOOD PRESSURE: 78 MMHG | BODY MASS INDEX: 45.1 KG/M2 | SYSTOLIC BLOOD PRESSURE: 140 MMHG | WEIGHT: 315 LBS | HEIGHT: 70 IN

## 2023-02-22 DIAGNOSIS — R73.03 PREDIABETES: Primary | ICD-10-CM

## 2023-02-22 DIAGNOSIS — E66.01 MORBID OBESITY WITH BMI OF 60.0-69.9, ADULT (HCC): ICD-10-CM

## 2023-02-22 DIAGNOSIS — G47.33 OBSTRUCTIVE SLEEP APNEA: ICD-10-CM

## 2023-02-22 DIAGNOSIS — E55.9 VITAMIN D DEFICIENCY: ICD-10-CM

## 2023-02-22 DIAGNOSIS — M10.9 GOUT: ICD-10-CM

## 2023-02-22 DIAGNOSIS — I10 BENIGN ESSENTIAL HYPERTENSION: ICD-10-CM

## 2023-02-22 DIAGNOSIS — K21.00 CHRONIC REFLUX ESOPHAGITIS: ICD-10-CM

## 2023-02-22 DIAGNOSIS — E66.01 MORBID OBESITY (HCC): ICD-10-CM

## 2023-02-22 NOTE — ASSESSMENT & PLAN NOTE
- Discussed options of HealthyCORE-Intensive Lifestyle Intervention Program, Very Low Calorie Diet-VLCD, Conservative Program, Nicho-En-Y Gastric Bypass and Vertical Sleeve Gastrectomy and the role of weight loss medications  - Explained the importance of making lifestyle changes first before starting anti-obesity medications  - Patient should demonstrate lifestyle changes first before anti-obesity medication initiated  - Patient is interested in pursuing Conservative Program at this time and will call back with decision regarding treatment plan  He is consider HealthyCORE, but may also decide on individual dietician appointments for meal planning and guidance  - Surgical options would provide the most significant weight loss  - Initial weight loss goal of 5-10% weight loss for improved health  - Weight loss can improve patient's co-morbid conditions and/or prevent weight-related complications  - Labs reviewed from 9/2022 - CMP (glucose 111), A1C (6 4), lipids (total chol 203, )  - Labs ordered by PCP to be rechecked in the next couple of weeks - CMP, A1C  Will add CBC, Thyroid, Vit D, Uric acid    VLCD not advisable due to gout history  Medications reviewed today and may consider metformin for sugar control and/or GLP-1 if insurance will cover  Decision to be made at next appointment after meeting with dietician and getting lifestyle habits more balanced  Goals:  Do not skip meals  [id number] calorie meal plan provided as a guide along with high protein and low calorie snack ideas to choose instead of chips while driving  Food log via sheila - options include  www  myfitnesspal com, sparkpeople  com, loseit com, calorieking  com, baritastic  No sugary beverages  Increase water intake to at least 64oz of water daily  Begin to incorporate physical activity 3-4 times a week for 10-15 minutes, and increase by 5 minutes every week   Continue towards goal of 5 days per week for 30 minutes of MODERATE intensity PLUS 2 days per week of FULL BODY resistance training

## 2023-02-22 NOTE — ASSESSMENT & PLAN NOTE
Patient is currently managed on diltiazem 120mg, losartan/HCTZ 100-12 5mg and propranolol 160mg  BP still elevated in the office today - 140/78  Weight loss will likely help with BP control  Continue to follow up with PCP for management

## 2023-02-22 NOTE — ASSESSMENT & PLAN NOTE
Last A1C was 6 4  Labs to be drawn in the next couple of weeks  May benefit from metformin and/or GLP-1 to help with blood sugar control and weight loss

## 2023-02-22 NOTE — ASSESSMENT & PLAN NOTE
Sleeps daily with CPAP machine - good compliance  Weight loss will likely help with sleep apnea, and compliance with CPAP will aide in weight loss

## 2023-02-22 NOTE — ASSESSMENT & PLAN NOTE
Uric acid level to be drawn with next labs  Patient denies any gout flares in a couple of years  Will avoid VLCD

## 2023-02-22 NOTE — PROGRESS NOTES
Assessment/Plan: Morbid obesity ([facility] [address] )  - Discussed options of HealthyCORE-Intensive Lifestyle Intervention Program, Very Low Calorie Diet-VLCD, Conservative Program, Nicho-En-Y Gastric Bypass and Vertical Sleeve Gastrectomy and the role of weight loss medications  - Explained the importance of making lifestyle changes first before starting anti-obesity medications  - Patient should demonstrate lifestyle changes first before anti-obesity medication initiated  - Patient is interested in pursuing Conservative Program at this time and will call back with decision regarding treatment plan  He is consider HealthyCORE, but may also decide on individual dietician appointments for meal planning and guidance  - Surgical options would provide the most significant weight loss  - Initial weight loss goal of 5-10% weight loss for improved health  - Weight loss can improve patient's co-morbid conditions and/or prevent weight-related complications  - Labs reviewed from 9/2022 - CMP (glucose 111), A1C (6 4), lipids (total chol 203, )  - Labs ordered by PCP to be rechecked in the next couple of weeks - CMP, A1C  Will add CBC, Thyroid, Vit D, Uric acid    VLCD not advisable due to gout history  Medications reviewed today and may consider metformin for sugar control and/or GLP-1 if insurance will cover  Decision to be made at next appointment after meeting with dietician and getting lifestyle habits more balanced  Goals:  Do not skip meals  [id number] calorie meal plan provided as a guide along with high protein and low calorie snack ideas to choose instead of chips while driving  Food log via sheila - options include  www  myfitnesspal com, sparkpeople  com, loseit com, calorieking  com, baritaSwoon Editionsic  No sugary beverages  Increase water intake to at least 64oz of water daily  Begin to incorporate physical activity 3-4 times a week for 10-15 minutes, and increase by 5 minutes every week   Continue towards goal of 5 days per week for 30 minutes of MODERATE intensity PLUS 2 days per week of FULL BODY resistance training    Benign essential hypertension  Patient is currently managed on diltiazem 120mg, losartan/HCTZ 100-12 5mg and propranolol 160mg  BP still elevated in the office today - 140/78  Weight loss will likely help with BP control  Continue to follow up with PCP for management    Obstructive sleep apnea  Sleeps daily with CPAP machine - good compliance  Weight loss will likely help with sleep apnea, and compliance with CPAP will aide in weight loss    Chronic reflux esophagitis  Patient taking famotidine 20mg daily with good relief    Prediabetes  Last A1C was 6 4  Labs to be drawn in the next couple of weeks  May benefit from metformin and/or GLP-1 to help with blood sugar control and weight loss    Gout  Uric acid level to be drawn with next labs  Patient denies any gout flares in a couple of years  Will avoid VLCD          Clif Molina was seen today for consult  Diagnoses and all orders for this visit:    Prediabetes  -     CBC and differential; Future  -     TSH, 3rd generation with Free T4 reflex; Future    Morbid obesity with BMI of 60 0-69 9, adult ([facility] )  -     Ambulatory referral to Bariatric Surgery    Morbid obesity ([facility] )  -     Ambulatory referral to Bariatric Surgery  -     CBC and differential; Future  -     TSH, 3rd generation with Free T4 reflex; Future  -     Vitamin D 25 hydroxy; Future  -     Uric acid; Future    Benign essential hypertension  -     TSH, 3rd generation with Free T4 reflex; Future    Obstructive sleep apnea    Chronic reflux esophagitis    Gout  -     Uric acid; Future    Vitamin D deficiency  -     Vitamin D 25 hydroxy; Future       Patient denies personal history of pancreatitis  Patient also denies personal and family history of medullary thyroid cancer and multiple endocrine neoplasia type 2 (MEN 2 tumor)  Patient denies history of seizures or kidney stones      Total time spent: 30 min, with >50% face-to-face time spent counseling patient on nonsurgical interventions for the treatment of excess weight  Discussed in detail surgical and nonsurgical options including intensive lifestyle intervention program, very low-calorie diet program and conservative program  Surgical options discussed including gastric sleeve and gastric bypass  Discussed the role of weight loss medications  Counseled patient on diet behavior and exercise modification for weight loss  Follow up in approximately 6-8 weeks with Non-Surgical Physician/Advanced Practitioner  Subjective:   Chief Complaint   Patient presents with   • Consult     Pt is here for MWM consult       Patient ID: Mane Woody  is a 49 y o  male with excess weight/obesity here to pursue weight management  Previous notes and records have been reviewed  Past Medical History:   Diagnosis Date   • Acid reflux    • Cellulitis     RESOLVED:5/6/15   • CPAP (continuous positive airway pressure) dependence    • Hypertension    • Loose body in knee     RESOLVED:11/15/16   • Neoplasm of bone 01/28/2008    LAST ASSESSED: 1/28/08   • Patellar tendonitis     UNSPECIFIED LATERALITY, LAST ASSESSED: 10/19/12   • Seasonal allergies    • Seborrheic dermatitis 01/20/2010    LAST ASSESSED: 1/20/10   • Sleep apnea     wears cpap at night   • Sprain and strain     LATE EFFECT; RESOLVED: 5/6/15     Past Surgical History:   Procedure Laterality Date   • CHOLECYSTECTOMY     • FINGER FRACTURE SURGERY Left 1996    pinky   • KNEE ARTHROSCOPY Bilateral     left knee growth removed   • KNEE SURGERY Right 1992   • LIPOMA RESECTION      left side   • [place] EGD TRANSORAL BIOPSY SINGLE/MULTIPLE N/A 2/22/2016    Procedure: ESOPHAGOGASTRODUODENOSCOPY (EGD); Surgeon: Earlene Carreno MD;  Location: [facility] GI LAB;   Service: Gastroenterology       HPI:  Wt Readings from Last 20 Encounters:   02/22/23 (!) 191 kg (420 lb 12 8 oz)   02/07/23 (!) 194 kg (428 lb)   09/21/22 (!) 190 kg (418 lb)   09/20/22 (!) 190 kg (419 lb)   08/08/22 (!) 188 kg (414 lb)   03/09/22 (!) 194 kg (427 lb)   12/08/21 (!) 195 kg (429 lb)   08/10/21 (!) 196 kg (432 lb)   04/16/21 (!) 192 kg (424 lb)   03/05/21 (!) 195 kg (430 lb)   02/05/20 (!) 194 kg (427 lb)   05/15/19 (!) 169 kg (372 lb)   02/06/19 (!) 169 kg (373 lb)   12/10/18 (!) 170 kg (374 lb)   09/04/18 (!) 169 kg (372 lb)   05/30/18 (!) 192 kg (423 lb)   02/23/18 (!) 171 kg (378 lb)   12/01/17 (!) 171 kg (377 lb 15 9 oz)   05/26/17 (!) 169 kg (372 lb 15 9 oz)   01/27/17 (!) 171 kg (377 lb 15 9 oz)     Patient presents today to medical weight management office for consult  Patient has struggled with his weight his whole life  He has attempted many programs in the past including weight watchers and Noom with little improvement in weight, and will gain more when he stops  Patient understands that surgery will give him the most weight loss but wants to try conservative treatment options first  He believes he needs to change his lifestyle and habits and if he does that he will lose weight  Patient works as a  and works at night  Eating habits include grazing all night and snacking when he comes home in the morning  Patient sleeps with a CPAP machine every night  Patient has upcoming lab work with PCP  Obesity/Excess Weight:  Severity: Very Severe  Onset:  lifelong    Modifiers: Diet and Exercise and Commercial Weight Loss Programs-ie  Weight Watchers, Elida Cones, Nutrisystem, etc   Contributing factors: Poor Food Choices, Insufficient Physical Activity, Stress/Emotional Eating and Insufficient time to make appropriate lifestyle changes  Associated symptoms: comorbid conditions, fatigue, increased joint pain, decreased exercise capacity and inability to do certain activities    Diet recall:   Works nights - wakes around [employer] throughout the day - worst when he gets home after work    Before work (7-8pm): cold cuts sandwich, leftovers - chicken or pasta  While driving: coffee, protein bars, PB&J sandwich  After work (7-9am): yogurt, snacks - chips      Hydration: coffee - truvia and A2 millk, 40oz water with propel packets, crystal light iced tea occasionally   Alcohol: no  Smoking: no  Exercise: no  Occupation: night   Sleep: well with CPAP    Highest weight: current  Current weight: 428lbs  Goal weight: 220lbs    Colonoscopy: 8/2022    The following portions of the patient's history were reviewed and updated as appropriate: allergies, current medications, past family history, past medical history, past social history, past surgical history, and problem list     Family History   Problem Relation Age of Onset   • Breast cancer Mother    • Atrial fibrillation Father    • Sleep apnea Father    • Cancer Maternal Grandmother    • Arthritis Family    • Breast cancer Family    • Stomach cancer Family    • Liver cancer Family         Review of Systems   Constitutional: Negative for fatigue  Respiratory: Positive for shortness of breath (with exertion)  Negative for cough  Cardiovascular: Negative for chest pain, palpitations and leg swelling  Gastrointestinal: Negative for abdominal pain, constipation, diarrhea and nausea  GERD - managed with famotidine   Musculoskeletal: Positive for arthralgias (bilateral knee pain)  Negative for back pain  Psychiatric/Behavioral: Negative for dysphoric mood  The patient is not nervous/anxious  Objective:  /78   Pulse 83   Ht 5' 10" (1 778 m)   Wt (!) 191 kg (420 lb 12 8 oz)   BMI 60 38 kg/m²     Physical Exam  Vitals and nursing note reviewed  Constitutional:       Appearance: Normal appearance  He is obese  Pulmonary:      Effort: Pulmonary effort is normal    Neurological:      General: No focal deficit present  Mental Status: He is alert and oriented to person, place, and time     Psychiatric:         Mood and Affect: Mood normal          Behavior: Behavior normal          Thought Content: Thought content normal          Judgment: Judgment normal                 Labs and Imaging  Recent labs and imaging have been personally reviewed    No results found for: WBC, HGB, HCT, MCV, PLT  Lab Results   Component Value Date     05/23/2017    SODIUM 141 09/06/2022    K 4 1 09/06/2022    CL 98 09/06/2022    CO2 26 09/06/2022    BUN 15 09/06/2022    CREATININE 0 80 09/06/2022    GLUC 111 (H) 09/06/2022    CALCIUM 9 5 07/01/2020    AST 18 09/06/2022    ALT 21 09/06/2022    ALKPHOS 75 07/01/2020    PROT 7 1 05/23/2017    TP 7 3 09/06/2022    BILITOT 0 3 05/23/2017    TBILI 0 6 09/06/2022    EGFR 109 09/06/2022     Lab Results   Component Value Date    HGBA1C 6 4 (H) 09/06/2022     No results found for: CMF2QCNCLAUF, TSH  Lab Results   Component Value Date    CHOLESTEROL 203 (H) 09/06/2022     Lab Results   Component Value Date    HDL 42 09/06/2022     Lab Results   Component Value Date    TRIG 122 09/06/2022     Lab Results   Component Value Date    LDLCALC 139 (H) 09/06/2022

## 2023-03-21 LAB
25(OH)D3+25(OH)D2 SERPL-MCNC: 39.3 NG/ML (ref 30–100)
ALBUMIN SERPL-MCNC: 4.4 G/DL (ref 4–5)
ALBUMIN/GLOB SERPL: 1.4 {RATIO} (ref 1.2–2.2)
ALP SERPL-CCNC: 79 IU/L (ref 44–121)
ALT SERPL-CCNC: 18 IU/L (ref 0–44)
AST SERPL-CCNC: 18 IU/L (ref 0–40)
BASOPHILS # BLD AUTO: 0 X10E3/UL (ref 0–0.2)
BASOPHILS NFR BLD AUTO: 0 %
BILIRUB SERPL-MCNC: 0.5 MG/DL (ref 0–1.2)
BUN SERPL-MCNC: 16 MG/DL (ref 6–24)
BUN/CREAT SERPL: 19 (ref 9–20)
CALCIUM SERPL-MCNC: 9.6 MG/DL (ref 8.7–10.2)
CHLORIDE SERPL-SCNC: 101 MMOL/L (ref 96–106)
CO2 SERPL-SCNC: 23 MMOL/L (ref 20–29)
CREAT SERPL-MCNC: 0.83 MG/DL (ref 0.76–1.27)
EGFR: 107 ML/MIN/1.73
EOSINOPHIL # BLD AUTO: 0.1 X10E3/UL (ref 0–0.4)
EOSINOPHIL NFR BLD AUTO: 1 %
ERYTHROCYTE [DISTWIDTH] IN BLOOD BY AUTOMATED COUNT: 12.9 % (ref 11.6–15.4)
GLOBULIN SER-MCNC: 3.2 G/DL (ref 1.5–4.5)
GLUCOSE SERPL-MCNC: 118 MG/DL (ref 70–99)
HBA1C MFR BLD: 6.3 % (ref 4.8–5.6)
HCT VFR BLD AUTO: 47.7 % (ref 37.5–51)
HGB BLD-MCNC: 15.9 G/DL (ref 13–17.7)
IMM GRANULOCYTES # BLD: 0.1 X10E3/UL (ref 0–0.1)
IMM GRANULOCYTES NFR BLD: 1 %
LYMPHOCYTES # BLD AUTO: 1.5 X10E3/UL (ref 0.7–3.1)
LYMPHOCYTES NFR BLD AUTO: 18 %
MCH RBC QN AUTO: 28.6 PG (ref 26.6–33)
MCHC RBC AUTO-ENTMCNC: 33.3 G/DL (ref 31.5–35.7)
MCV RBC AUTO: 86 FL (ref 79–97)
MONOCYTES # BLD AUTO: 0.7 X10E3/UL (ref 0.1–0.9)
MONOCYTES NFR BLD AUTO: 8 %
NEUTROPHILS # BLD AUTO: 6 X10E3/UL (ref 1.4–7)
NEUTROPHILS NFR BLD AUTO: 72 %
PLATELET # BLD AUTO: 387 X10E3/UL (ref 150–450)
POTASSIUM SERPL-SCNC: 4 MMOL/L (ref 3.5–5.2)
PROT SERPL-MCNC: 7.6 G/DL (ref 6–8.5)
RBC # BLD AUTO: 5.55 X10E6/UL (ref 4.14–5.8)
SODIUM SERPL-SCNC: 143 MMOL/L (ref 134–144)
TSH SERPL DL<=0.005 MIU/L-ACNC: 1.34 UIU/ML (ref 0.45–4.5)
URATE SERPL-MCNC: 6.7 MG/DL (ref 3.8–8.4)
WBC # BLD AUTO: 8.4 X10E3/UL (ref 3.4–10.8)

## 2023-04-08 PROBLEM — J01.00 ACUTE NON-RECURRENT MAXILLARY SINUSITIS: Status: RESOLVED | Noted: 2023-02-07 | Resolved: 2023-04-08

## 2023-05-28 DIAGNOSIS — K21.00 CHRONIC REFLUX ESOPHAGITIS: ICD-10-CM

## 2023-05-28 RX ORDER — FAMOTIDINE 20 MG/1
TABLET, FILM COATED ORAL
Qty: 180 TABLET | Refills: 1 | Status: SHIPPED | OUTPATIENT
Start: 2023-05-28

## 2023-07-24 ENCOUNTER — OFFICE VISIT (OUTPATIENT)
Dept: FAMILY MEDICINE CLINIC | Facility: CLINIC | Age: 48
End: 2023-07-24
Payer: COMMERCIAL

## 2023-07-24 VITALS
OXYGEN SATURATION: 96 % | HEART RATE: 80 BPM | DIASTOLIC BLOOD PRESSURE: 72 MMHG | TEMPERATURE: 98 F | BODY MASS INDEX: 45.1 KG/M2 | RESPIRATION RATE: 20 BRPM | SYSTOLIC BLOOD PRESSURE: 112 MMHG | HEIGHT: 70 IN | WEIGHT: 315 LBS

## 2023-07-24 DIAGNOSIS — R06.2 WHEEZE: ICD-10-CM

## 2023-07-24 DIAGNOSIS — B00.9 HERPES: ICD-10-CM

## 2023-07-24 DIAGNOSIS — J06.9 UPPER RESPIRATORY TRACT INFECTION, UNSPECIFIED TYPE: Primary | ICD-10-CM

## 2023-07-24 PROCEDURE — 99213 OFFICE O/P EST LOW 20 MIN: CPT | Performed by: INTERNAL MEDICINE

## 2023-07-24 RX ORDER — LEVOFLOXACIN 500 MG/1
500 TABLET, FILM COATED ORAL EVERY 24 HOURS
Qty: 7 TABLET | Refills: 0 | Status: SHIPPED | OUTPATIENT
Start: 2023-07-24 | End: 2023-07-31

## 2023-07-24 RX ORDER — ALBUTEROL SULFATE 90 UG/1
2 AEROSOL, METERED RESPIRATORY (INHALATION) EVERY 6 HOURS PRN
Qty: 54 G | Refills: 3 | Status: SHIPPED | OUTPATIENT
Start: 2023-07-24

## 2023-07-24 RX ORDER — VALACYCLOVIR HYDROCHLORIDE 1 G/1
1000 TABLET, FILM COATED ORAL 2 TIMES DAILY
Qty: 30 TABLET | Refills: 3 | Status: SHIPPED | OUTPATIENT
Start: 2023-07-24 | End: 2023-07-26

## 2023-07-24 NOTE — PROGRESS NOTES
Name: Liam Flores      : 1975      MRN: [id number]  Encounter Provider: Kanika Edward MD  Encounter Date: 2023   Encounter department: [address] [place] [address]     1. Upper respiratory tract infection, unspecified type  Comments:  Supportive care discussed, follow up if not improving. Orders:  -     levofloxacin (LEVAQUIN) 500 mg tablet; Take 1 tablet (500 mg total) by mouth every 24 hours for 7 days  -     Promethazine-DM (PHENERGAN-DM) 6.25-15 mg/5 mL oral syrup; Take 5 mL by mouth 4 (four) times a day as needed for cough    2. Herpes  -     valACYclovir (VALTREX) 1,000 mg tablet; Take 1 tablet (1,000 mg total) by mouth 2 (two) times a day for 2 days    3. Wheeze  -     albuterol (ProAir HFA) 90 mcg/act inhaler; Inhale 2 puffs every 6 (six) hours as needed for wheezing           Subjective      Here with acute illness for one week. Has cough, congestion, a wheeze that feels like its in his upper chest.  No fever. Cough is unrelenting, keeps him up at night. He waited the week before he came in. Tried inhaler, flonase and mucinex without relief. Symptoms not worsening, not improving. Review of Systems   Constitutional: Positive for fatigue. Negative for fever. HENT: Positive for congestion, rhinorrhea and sore throat. Respiratory: Positive for cough and wheezing. Negative for shortness of breath.         Current Outpatient Medications on File Prior to Visit   Medication Sig   • Ascorbic Acid (VITAMIN C) 1000 MG tablet Take 1,000 mg by mouth   • cetirizine (ZyrTEC) 10 mg tablet Take 1 tablet by mouth daily   • Cholecalciferol (Vitamin D3) 50 MCG (2000 UT) TABS Take 2,000 Units by mouth daily   • Cyanocobalamin (Vitamin B-12) 5000 MCG SUBL Place under the tongue   • diltiazem (CARDIZEM CD) 120 mg 24 hr capsule TAKE 1 CAPSULE DAILY   • famotidine (PEPCID) 20 mg tablet TAKE 1 TABLET TWICE A DAY  AS NEEDED FOR REFLUX   • fluticasone (FLONASE) 50 mcg/act nasal spray 1 spray into each nostril daily   • losartan-hydrochlorothiazide (HYZAAR) 100-12.5 MG per tablet TAKE 1 TABLET DAILY   • montelukast (SINGULAIR) 10 mg tablet TAKE 1 TABLET DAILY   • Omega-3 1400 MG CAPS Take 1 capsule by mouth daily. • propranolol (INDERAL LA) 160 mg TAKE 1 CAPSULE DAILY   • Turmeric 500 MG CAPS Take by mouth   • vitamin E, tocopherol, 400 units capsule Take 400 Units by mouth daily. • zinc gluconate 50 mg tablet Take 50 mg by mouth daily   • [DISCONTINUED] albuterol (ACCUNEB) 1.25 MG/3ML nebulizer solution Take 1.25 mg by nebulization every 6 (six) hours as needed for wheezing As needed       Objective     /72   Pulse 80   Temp 98 °F (36.7 °C)   Resp 20   Ht 5' 10" (1.778 m)   Wt (!) 194 kg (426 lb 12.8 oz)   SpO2 96%   BMI 61.24 kg/m²     Physical Exam  HENT:      Right Ear: Tympanic membrane is retracted. Left Ear: Tympanic membrane is retracted. Nose: Mucosal edema and rhinorrhea present. Cardiovascular:      Rate and Rhythm: Normal rate and regular rhythm. Heart sounds: Normal heart sounds. Pulmonary:      Effort: Pulmonary effort is normal.      Breath sounds: Normal breath sounds. No wheezing or rales. Musculoskeletal:      Cervical back: Neck supple. Lymphadenopathy:      Cervical: No cervical adenopathy.        Fay Rosas MD

## 2023-07-25 DIAGNOSIS — I10 BENIGN ESSENTIAL HYPERTENSION: ICD-10-CM

## 2023-07-25 DIAGNOSIS — R06.2 WHEEZE: ICD-10-CM

## 2023-07-25 DIAGNOSIS — J30.9 ALLERGIC RHINITIS, UNSPECIFIED SEASONALITY, UNSPECIFIED TRIGGER: ICD-10-CM

## 2023-07-25 RX ORDER — LOSARTAN POTASSIUM AND HYDROCHLOROTHIAZIDE 12.5; 1 MG/1; MG/1
TABLET ORAL
Qty: 90 TABLET | Refills: 1 | Status: SHIPPED | OUTPATIENT
Start: 2023-07-25

## 2023-07-25 RX ORDER — PROPRANOLOL HYDROCHLORIDE 160 MG/1
CAPSULE, EXTENDED RELEASE ORAL
Qty: 90 CAPSULE | Refills: 1 | Status: SHIPPED | OUTPATIENT
Start: 2023-07-25

## 2023-07-25 RX ORDER — MONTELUKAST SODIUM 10 MG/1
TABLET ORAL
Qty: 90 TABLET | Refills: 1 | Status: SHIPPED | OUTPATIENT
Start: 2023-07-25

## 2023-07-25 RX ORDER — DILTIAZEM HYDROCHLORIDE 120 MG/1
CAPSULE, COATED, EXTENDED RELEASE ORAL
Qty: 90 CAPSULE | Refills: 1 | Status: SHIPPED | OUTPATIENT
Start: 2023-07-25

## 2023-07-26 RX ORDER — ALBUTEROL SULFATE 90 UG/1
2 AEROSOL, METERED RESPIRATORY (INHALATION) EVERY 6 HOURS PRN
OUTPATIENT
Start: 2023-07-26

## 2023-07-26 RX ORDER — ALBUTEROL SULFATE 90 UG/1
2 AEROSOL, METERED RESPIRATORY (INHALATION) EVERY 6 HOURS PRN
Qty: 54 G | Refills: 5 | Status: SHIPPED | OUTPATIENT
Start: 2023-07-26

## 2023-09-26 ENCOUNTER — TELEPHONE (OUTPATIENT)
Dept: PULMONOLOGY | Facility: MEDICAL CENTER | Age: 48
End: 2023-09-26

## 2023-10-09 ENCOUNTER — OFFICE VISIT (OUTPATIENT)
Dept: PULMONOLOGY | Facility: MEDICAL CENTER | Age: 48
End: 2023-10-09
Payer: COMMERCIAL

## 2023-10-09 VITALS
HEART RATE: 75 BPM | HEIGHT: 70 IN | DIASTOLIC BLOOD PRESSURE: 86 MMHG | SYSTOLIC BLOOD PRESSURE: 124 MMHG | TEMPERATURE: 98.4 F | RESPIRATION RATE: 12 BRPM | WEIGHT: 315 LBS | OXYGEN SATURATION: 93 % | BODY MASS INDEX: 45.1 KG/M2

## 2023-10-09 DIAGNOSIS — E66.01 MORBID OBESITY (HCC): ICD-10-CM

## 2023-10-09 DIAGNOSIS — G47.33 OBSTRUCTIVE SLEEP APNEA: Primary | ICD-10-CM

## 2023-10-09 DIAGNOSIS — J31.0 CHRONIC RHINITIS: ICD-10-CM

## 2023-10-09 PROCEDURE — 99214 OFFICE O/P EST MOD 30 MIN: CPT | Performed by: NURSE PRACTITIONER

## 2023-10-09 NOTE — ASSESSMENT & PLAN NOTE
Doris Raines will continue with Singulair, Zyrtec, and Flonase nasal spray. Discussed mechanism of action of Flonase and timing of action.

## 2023-10-09 NOTE — PROGRESS NOTES
Pulmonary Follow-Up Note   Nisa Wilkins 51 y.o. male MRN: [id number]  10/9/2023      Assessment/Plan:    Problem List Items Addressed This Visit        Respiratory    Chronic rhinitis     Cat Lugo will continue with Singulair, Zyrtec, and Flonase nasal spray. Discussed mechanism of action of Flonase and timing of action. Obstructive sleep apnea - Primary     Compliance data was reviewed today for his auto CPAP set from 11 to 16 cm water. His average AHI with use is 7.9 and his average time in large leak per day is 48 seconds. Given his increased AHI, I discussed changing his settings to a range of 11 to 20 cm water. He is agreeable. This order was placed today. Should he have any difficulties with higher pressures, he will return call to the office or otherwise follow-up in 1 year. He is aware of proper use and maintenance of his machine. He will continue with nightly use and is deriving benefit. Relevant Orders    PAP DME Pressure Change       Other    Morbid obesity ([address])     Lifestyle modifications and weight loss as able. Return in about 1 year (around 10/9/2024), or if symptoms worsen or fail to improve. All of Cat Lugo' questions were answered prior to leaving the office today. He will follow-up with Dr. Lio Jessica in 12 months or sooner should the need arise. He is aware to call our office with any further questions or concerns. History of Present Illness   Reason for Visit: Follow-up  Chief Complaint: "I noticed my apnea numbers are going up a little bit."  HPI: Nisa Wilkins is a 51 y.o. male who presents to the office today for follow-up of sleep apnea. Overall, he reports he is doing well. He has no somnolence during his waking hours and no unrefreshing sleep. He does not feel tired or have the frequent need to nap. He is wearing his CPAP nightly. He does note that his AHI was mildly increased, but again is having no new symptoms due to this.   He does note rhinitis and allergy symptoms this allergy season. Aside from the above, no other complaints. Weight is stable. Review of Systems   Constitutional: Negative for appetite change and fever. HENT: Negative for ear pain, postnasal drip, rhinorrhea, sneezing, sore throat and trouble swallowing. Cardiovascular: Negative for chest pain. Musculoskeletal: Negative for myalgias. Neurological: Negative for headaches. All other systems reviewed and are negative. A full 12-point review of systems was completed and is negative except for those outlined in the HPI. Historical Information   Past Medical History:   Diagnosis Date   • Acid reflux    • Cellulitis     RESOLVED:5/6/15   • CPAP (continuous positive airway pressure) dependence    • Hypertension    • Loose body in knee     RESOLVED:11/15/16   • Neoplasm of bone 01/28/2008    LAST ASSESSED: 1/28/08   • Patellar tendonitis     UNSPECIFIED LATERALITY, LAST ASSESSED: 10/19/12   • Seasonal allergies    • Seborrheic dermatitis 01/20/2010    LAST ASSESSED: 1/20/10   • Sleep apnea     wears cpap at night   • Sprain and strain     LATE EFFECT; RESOLVED: 5/6/15     Past Surgical History:   Procedure Laterality Date   • CHOLECYSTECTOMY     • FINGER FRACTURE SURGERY Left 1996    pinky   • KNEE ARTHROSCOPY Bilateral     left knee growth removed   • KNEE SURGERY Right 1992   • LIPOMA RESECTION      left side   • [place] EGD TRANSORAL BIOPSY SINGLE/MULTIPLE N/A 2/22/2016    Procedure: ESOPHAGOGASTRODUODENOSCOPY (EGD); Surgeon: Mirela Lind MD;  Location: [facility] GI LAB;   Service: Gastroenterology     Family History   Problem Relation Age of Onset   • Breast cancer Mother    • Atrial fibrillation Father    • Sleep apnea Father    • Cancer Maternal Grandmother    • Arthritis Family    • Breast cancer Family    • Stomach cancer Family    • Liver cancer Family      Social History   Social History     Substance and Sexual Activity   Alcohol Use Never     Social History Substance and Sexual Activity   Drug Use No     Social History     Tobacco Use   Smoking Status Never   Smokeless Tobacco Never     E-Cigarette/Vaping   • E-Cigarette Use Never User      E-Cigarette/Vaping Substances   • Nicotine No    • THC No    • CBD No    • Flavoring No    • Other No    • Unknown No        Meds/Allergies     Current Outpatient Medications:   •  albuterol (ProAir HFA) 90 mcg/act inhaler, Inhale 2 puffs every 6 (six) hours as needed for wheezing, Disp: 54 g, Rfl: 5  •  Ascorbic Acid (VITAMIN C) 1000 MG tablet, Take 1,000 mg by mouth, Disp: , Rfl:   •  cetirizine (ZyrTEC) 10 mg tablet, Take 1 tablet by mouth daily, Disp: , Rfl:   •  Cholecalciferol (Vitamin D3) 50 MCG (2000 UT) TABS, Take 2,000 Units by mouth daily, Disp: , Rfl:   •  Cyanocobalamin (Vitamin B-12) 5000 MCG SUBL, Place under the tongue, Disp: , Rfl:   •  diltiazem (CARDIZEM CD) 120 mg 24 hr capsule, TAKE 1 CAPSULE DAILY, Disp: 90 capsule, Rfl: 1  •  famotidine (PEPCID) 20 mg tablet, TAKE 1 TABLET TWICE A DAY  AS NEEDED FOR REFLUX, Disp: 180 tablet, Rfl: 1  •  fluticasone (FLONASE) 50 mcg/act nasal spray, 1 spray into each nostril daily, Disp: , Rfl:   •  losartan-hydrochlorothiazide (HYZAAR) 100-12.5 MG per tablet, TAKE 1 TABLET DAILY, Disp: 90 tablet, Rfl: 1  •  montelukast (SINGULAIR) 10 mg tablet, TAKE 1 TABLET DAILY, Disp: 90 tablet, Rfl: 1  •  Omega-3 1400 MG CAPS, Take 1 capsule by mouth daily. , Disp: , Rfl:   •  propranolol (INDERAL LA) 160 mg, TAKE 1 CAPSULE DAILY, Disp: 90 capsule, Rfl: 1  •  Turmeric 500 MG CAPS, Take by mouth, Disp: , Rfl:   •  vitamin E, tocopherol, 400 units capsule, Take 400 Units by mouth daily. , Disp: , Rfl:   •  zinc gluconate 50 mg tablet, Take 50 mg by mouth daily, Disp: , Rfl:   •  albuterol (ProAir HFA) 90 mcg/act inhaler, Inhale 2 puffs every 6 (six) hours as needed for wheezing, Disp: 54 g, Rfl: 3  •  Promethazine-DM (PHENERGAN-DM) 6.25-15 mg/5 mL oral syrup, Take 5 mL by mouth 4 (four) times a day as needed for cough, Disp: 180 mL, Rfl: 0  •  valACYclovir (VALTREX) 1,000 mg tablet, Take 1 tablet (1,000 mg total) by mouth 2 (two) times a day for 2 days, Disp: 30 tablet, Rfl: 3  No Known Allergies    Vitals: Blood pressure 124/86, pulse 75, temperature 98.4 °F (36.9 °C), temperature source Temporal, resp. rate 12, height 5' 10" (1.778 m), weight (!) 190 kg (418 lb), SpO2 93 %. Body mass index is 59.98 kg/m². Oxygen Therapy  SpO2: 93 %    Physical Exam:  Physical Exam  Vitals reviewed. Constitutional:       General: He is not in acute distress. Appearance: He is well-developed. He is morbidly obese. He is not ill-appearing or toxic-appearing. HENT:      Head: Normocephalic and atraumatic. Eyes:      General: No scleral icterus. Neck:      Trachea: No tracheal deviation. Cardiovascular:      Rate and Rhythm: Normal rate. Pulmonary:      Effort: Pulmonary effort is normal. No tachypnea, accessory muscle usage or respiratory distress. Musculoskeletal:      Cervical back: Neck supple. Right lower leg: No edema. Left lower leg: No edema. Neurological:      Mental Status: He is alert and oriented to person, place, and time. GCS: GCS eye subscore is 4. GCS verbal subscore is 5. GCS motor subscore is 6. Psychiatric:         Speech: Speech normal.         Behavior: Behavior normal. Behavior is cooperative. No new labs or diagnostic testing      LIU Mar  [facility]        Portions of the record may have been created with voice recognition software. Occasional wrong word or "sound a like" substitutions may have occurred due to the inherent limitations of voice recognition software. Read the chart carefully and recognize, using context, where substitutions have occurred or contact the dictating provider.     Answers for HPI/ROS submitted by the patient on 10/8/2023  Do you have shortness of breath that occurs with effort or exertion?: No  Do you have ear congestion?: No  Do you have heartburn?: No  Do you have fatigue?: No  Do you have nasal congestion?: No  Do you have shortness of breath when lying flat?: No  Do you have shortness of breath when you wake up?: No  Do you have sweats?: No  Have you experienced weight loss?: No

## 2023-10-09 NOTE — ASSESSMENT & PLAN NOTE
Compliance data was reviewed today for his auto CPAP set from 11 to 16 cm water. His average AHI with use is 7.9 and his average time in large leak per day is 48 seconds. Given his increased AHI, I discussed changing his settings to a range of 11 to 20 cm water. He is agreeable. This order was placed today. Should he have any difficulties with higher pressures, he will return call to the office or otherwise follow-up in 1 year. He is aware of proper use and maintenance of his machine. He will continue with nightly use and is deriving benefit. RT at bedside for breathing treatment.

## 2023-10-24 ENCOUNTER — APPOINTMENT (OUTPATIENT)
Dept: LAB | Facility: CLINIC | Age: 48
End: 2023-10-24
Payer: COMMERCIAL

## 2023-10-24 DIAGNOSIS — Z13.89 SCREENING FOR CARDIOVASCULAR, RESPIRATORY, AND GENITOURINARY DISEASES: ICD-10-CM

## 2023-10-24 DIAGNOSIS — Z13.83 SCREENING FOR CARDIOVASCULAR, RESPIRATORY, AND GENITOURINARY DISEASES: ICD-10-CM

## 2023-10-24 DIAGNOSIS — R73.03 PREDIABETES: ICD-10-CM

## 2023-10-24 DIAGNOSIS — Z12.5 PROSTATE CANCER SCREENING: ICD-10-CM

## 2023-10-24 DIAGNOSIS — Z13.6 SCREENING FOR CARDIOVASCULAR, RESPIRATORY, AND GENITOURINARY DISEASES: ICD-10-CM

## 2023-10-24 LAB
ALBUMIN SERPL BCP-MCNC: 4.1 G/DL (ref 3.5–5)
ALP SERPL-CCNC: 70 U/L (ref 34–104)
ALT SERPL W P-5'-P-CCNC: 17 U/L (ref 7–52)
ANION GAP SERPL CALCULATED.3IONS-SCNC: 3 MMOL/L
AST SERPL W P-5'-P-CCNC: 13 U/L (ref 13–39)
BILIRUB SERPL-MCNC: 0.7 MG/DL (ref 0.2–1)
BUN SERPL-MCNC: 14 MG/DL (ref 5–25)
CALCIUM SERPL-MCNC: 9.4 MG/DL (ref 8.4–10.2)
CHLORIDE SERPL-SCNC: 100 MMOL/L (ref 96–108)
CHOLEST SERPL-MCNC: 188 MG/DL
CO2 SERPL-SCNC: 35 MMOL/L (ref 21–32)
CREAT SERPL-MCNC: 0.75 MG/DL (ref 0.6–1.3)
EST. AVERAGE GLUCOSE BLD GHB EST-MCNC: 134 MG/DL
GFR SERPL CREATININE-BSD FRML MDRD: 107 ML/MIN/1.73SQ M
GLUCOSE P FAST SERPL-MCNC: 117 MG/DL (ref 65–99)
HBA1C MFR BLD: 6.3 %
HDLC SERPL-MCNC: 36 MG/DL
LDLC SERPL CALC-MCNC: 126 MG/DL (ref 0–100)
POTASSIUM SERPL-SCNC: 4 MMOL/L (ref 3.5–5.3)
PROT SERPL-MCNC: 7.8 G/DL (ref 6.4–8.4)
PSA SERPL-MCNC: 0.99 NG/ML (ref 0–4)
SODIUM SERPL-SCNC: 138 MMOL/L (ref 135–147)
TRIGL SERPL-MCNC: 128 MG/DL

## 2023-10-24 PROCEDURE — 80061 LIPID PANEL: CPT

## 2023-10-24 PROCEDURE — G0103 PSA SCREENING: HCPCS

## 2023-10-24 PROCEDURE — 36415 COLL VENOUS BLD VENIPUNCTURE: CPT

## 2023-10-24 PROCEDURE — 83036 HEMOGLOBIN GLYCOSYLATED A1C: CPT

## 2023-10-24 PROCEDURE — 80053 COMPREHEN METABOLIC PANEL: CPT

## 2023-10-27 ENCOUNTER — OFFICE VISIT (OUTPATIENT)
Dept: FAMILY MEDICINE CLINIC | Facility: CLINIC | Age: 48
End: 2023-10-27
Payer: COMMERCIAL

## 2023-10-27 VITALS
BODY MASS INDEX: 46.65 KG/M2 | DIASTOLIC BLOOD PRESSURE: 88 MMHG | TEMPERATURE: 98.2 F | HEART RATE: 71 BPM | SYSTOLIC BLOOD PRESSURE: 126 MMHG | HEIGHT: 69 IN | RESPIRATION RATE: 18 BRPM | WEIGHT: 315 LBS

## 2023-10-27 DIAGNOSIS — Z00.00 HEALTHCARE MAINTENANCE: Primary | ICD-10-CM

## 2023-10-27 DIAGNOSIS — E66.01 MORBID OBESITY (HCC): ICD-10-CM

## 2023-10-27 DIAGNOSIS — R73.03 PREDIABETES: ICD-10-CM

## 2023-10-27 DIAGNOSIS — G47.33 OBSTRUCTIVE SLEEP APNEA: ICD-10-CM

## 2023-10-27 DIAGNOSIS — I10 BENIGN ESSENTIAL HYPERTENSION: ICD-10-CM

## 2023-10-27 PROCEDURE — 99396 PREV VISIT EST AGE 40-64: CPT | Performed by: FAMILY MEDICINE

## 2023-10-27 NOTE — PROGRESS NOTES
Assessment/Plan:    No problem-specific Assessment & Plan notes found for this encounter. Cpe  Encourage wt loss due to marlo, prediabetes, htn, arthritis  Htn stable  Prediabetes diet advised  MARLO, f/u sleep specialist, inspire device discused     Diagnoses and all orders for this visit:    Healthcare maintenance    Prediabetes  -     Comprehensive metabolic panel; Future  -     Hemoglobin A1C; Future    Benign essential hypertension    Morbid obesity (HCC)    BMI 60.0-69.9, adult (HCC)    Obstructive sleep apnea        Return in about 6 months (around 4/27/2024) for Recheck. Subjective:      Patient ID: Brianne Bills is a 51 y.o. male. 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    Review of Systems   Constitutional:  Negative for chills and fever.          Current Outpatient Medications   Medication Sig Dispense Refill    albuterol (ProAir HFA) 90 mcg/act inhaler Inhale 2 puffs every 6 (six) hours as needed for wheezing 54 g 5    Ascorbic Acid (VITAMIN C) 1000 MG tablet Take 1,000 mg by mouth      cetirizine (ZyrTEC) 10 mg tablet Take 1 tablet by mouth daily      Cholecalciferol (Vitamin D3) 50 MCG (2000 UT) TABS Take 2,000 Units by mouth daily      Cyanocobalamin (Vitamin B-12) 5000 MCG SUBL Place under the tongue      diltiazem (CARDIZEM CD) 120 mg 24 hr capsule TAKE 1 CAPSULE DAILY 90 capsule 1    famotidine (PEPCID) 20 mg tablet TAKE 1 TABLET TWICE A DAY  AS NEEDED FOR REFLUX 180 tablet 1    fluticasone (FLONASE) 50 mcg/act nasal spray 1 spray into each nostril daily      losartan-hydrochlorothiazide (HYZAAR) 100-12.5 MG per tablet TAKE 1 TABLET DAILY 90 tablet 1    montelukast (SINGULAIR) 10 mg tablet TAKE 1 TABLET DAILY 90 tablet 1    Omega-3 1400 MG CAPS Take 1 capsule by mouth daily. propranolol (INDERAL LA) 160 mg TAKE 1 CAPSULE DAILY 90 capsule 1    Turmeric 500 MG CAPS Take by mouth      vitamin E, tocopherol, 400 units capsule Take 400 Units by mouth daily. zinc gluconate 50 mg tablet Take 50 mg by mouth daily      valACYclovir (VALTREX) 1,000 mg tablet Take 1 tablet (1,000 mg total) by mouth 2 (two) times a day for 2 days 30 tablet 3     No current facility-administered medications for this visit. Objective:    /88   Pulse 71   Temp 98.2 °F (36.8 °C)   Resp 18   Ht 5' 8.5" (1.74 m)   Wt (!) 190 kg (418 lb)   BMI 62.63 kg/m²        Physical Exam  Vitals and nursing note reviewed. Constitutional:       General: He is not in acute distress. Appearance: He is well-developed. He is obese. He is not ill-appearing. HENT:      Head: Normocephalic. Right Ear: Tympanic membrane normal.      Left Ear: Tympanic membrane normal.   Eyes:      General: No scleral icterus. Conjunctiva/sclera: Conjunctivae normal.   Cardiovascular:      Rate and Rhythm: Normal rate and regular rhythm. Heart sounds: No murmur heard. Pulmonary:      Effort: Pulmonary effort is normal. No respiratory distress. Breath sounds: No wheezing. Abdominal:      General: There is no distension. Palpations: Abdomen is soft. Tenderness: There is no abdominal tenderness. Hernia: No hernia is present. Genitourinary:     Penis: Normal.       Testes: Normal.      Prostate: Normal.      Rectum: Normal.   Musculoskeletal:         General: No deformity. Cervical back: Neck supple. Right lower leg: No edema. Left lower leg: No edema. Skin:     General: Skin is warm and dry. Coloration: Skin is not pale. Neurological:      Mental Status: He is alert. Motor: No weakness.       Gait: Gait normal.   Psychiatric:         Mood and Affect: Mood normal.         Behavior: Behavior normal.         Thought Content: Thought content normal.       BMI Counseling: Body mass index is 62.63 kg/m². The BMI is above normal. Nutrition recommendations include decreasing portion sizes and moderation in carbohydrate intake. Exercise recommendations include exercising 3-5 times per week. No pharmacotherapy was ordered. Rationale for BMI follow-up plan is due to patient being overweight or obese. Depression Screening and Follow-up Plan: Patient was screened for depression during today's encounter. They screened negative with a PHQ-2 score of 0.              Neville Fowler DO

## 2023-11-28 DIAGNOSIS — K21.00 CHRONIC REFLUX ESOPHAGITIS: ICD-10-CM

## 2023-11-28 RX ORDER — FAMOTIDINE 20 MG/1
TABLET, FILM COATED ORAL
Qty: 180 TABLET | Refills: 1 | Status: SHIPPED | OUTPATIENT
Start: 2023-11-28

## 2023-12-12 ENCOUNTER — OFFICE VISIT (OUTPATIENT)
Dept: FAMILY MEDICINE CLINIC | Facility: CLINIC | Age: 48
End: 2023-12-12
Payer: COMMERCIAL

## 2023-12-12 VITALS
HEART RATE: 83 BPM | SYSTOLIC BLOOD PRESSURE: 132 MMHG | TEMPERATURE: 97.2 F | WEIGHT: 315 LBS | RESPIRATION RATE: 18 BRPM | BODY MASS INDEX: 64.13 KG/M2 | DIASTOLIC BLOOD PRESSURE: 80 MMHG

## 2023-12-12 DIAGNOSIS — E66.01 MORBID OBESITY (HCC): ICD-10-CM

## 2023-12-12 DIAGNOSIS — N63.0 MASS OF BREAST, UNSPECIFIED LATERALITY: Primary | ICD-10-CM

## 2023-12-12 DIAGNOSIS — I10 BENIGN ESSENTIAL HYPERTENSION: ICD-10-CM

## 2023-12-12 PROCEDURE — 99214 OFFICE O/P EST MOD 30 MIN: CPT | Performed by: FAMILY MEDICINE
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
## 2024-01-06 ENCOUNTER — HOSPITAL ENCOUNTER (OUTPATIENT)
Dept: RADIOLOGY | Facility: HOSPITAL | Age: 49
Discharge: HOME | End: 2024-01-06
Attending: FAMILY MEDICINE
Payer: COMMERCIAL

## 2024-01-06 VITALS — BODY MASS INDEX: 44.1 KG/M2 | HEIGHT: 71 IN | WEIGHT: 315 LBS

## 2024-01-06 DIAGNOSIS — N63.10 BILATERAL BREAST LUMP: ICD-10-CM

## 2024-01-06 DIAGNOSIS — N63.20 BILATERAL BREAST LUMP: ICD-10-CM

## 2024-01-06 DIAGNOSIS — N63.0 MASS OF BREAST, UNSPECIFIED LATERALITY: ICD-10-CM

## 2024-01-06 DIAGNOSIS — N63.10 MASS OF RIGHT BREAST, UNSPECIFIED QUADRANT: Primary | ICD-10-CM

## 2024-01-06 PROCEDURE — 76642 ULTRASOUND BREAST LIMITED: CPT

## 2024-01-06 PROCEDURE — G0279 TOMOSYNTHESIS, MAMMO: HCPCS

## 2024-01-06 PROCEDURE — 77066 DX MAMMO INCL CAD BI: CPT

## 2024-01-10 NOTE — PROGRESS NOTES
Call placed to patient regarding recommendation for;    __X___ RIGHT ______LEFT      __X___Ultrasound guided  ______Stereotactic breast biopsy.    Pt states that procedure was explained to him, additional questions answered at this time    __X___Verbalized understanding.      Blood thinners: No: ___X__ Yes: _____ What:     Biopsy teaching sheet given:  _____yes __X__no (All teaching points discussed during call, pt with no questions at this time, pt adv to arrive at 1045 for 1100 biopsy)    Pt has L. Depre name/# for any further questions/needs

## 2024-01-29 ENCOUNTER — HOSPITAL ENCOUNTER (OUTPATIENT)
Dept: RADIOLOGY | Facility: HOSPITAL | Age: 49
Discharge: HOME | End: 2024-01-29
Attending: FAMILY MEDICINE
Payer: COMMERCIAL

## 2024-01-29 ENCOUNTER — HOSPITAL ENCOUNTER (OUTPATIENT)
Dept: RADIOLOGY | Facility: HOSPITAL | Age: 49
Discharge: HOME | End: 2024-01-29

## 2024-01-29 VITALS — SYSTOLIC BLOOD PRESSURE: 128 MMHG | DIASTOLIC BLOOD PRESSURE: 76 MMHG

## 2024-01-29 DIAGNOSIS — R92.8 ABNORMAL MAMMOGRAM: ICD-10-CM

## 2024-01-29 DIAGNOSIS — N63.10 MASS OF RIGHT BREAST, UNSPECIFIED QUADRANT: ICD-10-CM

## 2024-01-29 PROCEDURE — 88305 TISSUE EXAM BY PATHOLOGIST: CPT | Performed by: PATHOLOGY

## 2024-01-29 PROCEDURE — 19083 BX BREAST 1ST LESION US IMAG: CPT

## 2024-01-29 PROCEDURE — 88341 IMHCHEM/IMCYTCHM EA ADD ANTB: CPT | Performed by: PATHOLOGY

## 2024-01-29 PROCEDURE — A4648 IMPLANTABLE TISSUE MARKER: HCPCS

## 2024-01-29 PROCEDURE — 88342 IMHCHEM/IMCYTCHM 1ST ANTB: CPT | Performed by: PATHOLOGY

## 2024-01-29 RX ORDER — LIDOCAINE HYDROCHLORIDE 10 MG/ML
5 INJECTION, SOLUTION EPIDURAL; INFILTRATION; INTRACAUDAL; PERINEURAL ONCE
Status: COMPLETED | OUTPATIENT
Start: 2024-01-29 | End: 2024-01-29

## 2024-01-29 RX ADMIN — LIDOCAINE HYDROCHLORIDE 5 ML: 10 INJECTION, SOLUTION EPIDURAL; INFILTRATION; INTRACAUDAL; PERINEURAL at 11:39

## 2024-01-29 NOTE — PROGRESS NOTES
Ice pack given:    __x___ yes _____ no  *Re-usable ice pack provided to patient prior to discharge home today & patient educated on importance of use (to reduce post-procedural risk for bruising, bleeding, or hematoma development at biopsy site).    Discharge instructions reviewed and given to patient:    __x___ yes _____ no      Discharged via:    __x___ ambulatory    _____ wheelchair    _____ stretcher      Biopsy site clean and dry with no bleeding on discharge:    __x___ yes _____ no
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
## 2024-01-29 NOTE — LETTER
February 1, 2024   Patient: Pedro Irving   YOB: 1975   Date of Visit: 1/29/2024     Dear Dr. Hampton & Abe,    Monday 01/29/2024 US Guided biopsy results are BENIGN. Per radiologist Dr Linsdey, he recommends a 6 month follow-up Right Diagnostic Mammogram to assess for stability of Right Breast/ Anterior Chest fibrosis and fat necrosis. Due to your family history Abe, he is requesting that we have a surgical oncologist/ breast specialist see you for formal consultation, to help closer manage your care and clinical symptoms, at least for the next few interim years.    I am including a list of our [facility] specialists for you to review. Once you have had a chance to review information regarding our specialists, if you could please touch base with Dr Hampton so that he may then route an electronic referral to the physician's scheduling team for you to establish care/ formal consultation.    Here is a completed list of our practicing breast surgeons, and surgical oncologists in the [facility] so that you may review. We have a few specialists throughout our [facility] network & I just wanted to forward a written copy of the surgeons names, office information, and campuses at which their services are available, as it may be easier to have on hand in text to reflect our discussion over the phone earlier this morning.    Dr Bill Cole- [facility] Office: [address] [place] [address]  *Surgical coordination, Medical Oncology consultation, & Infusion treatments would be coordinated through [facility] Radiation Oncology if indicated through [facility] or [facility] based on patient or physician preference.  Office P# [phone]    Dr Jone Correa- [facility], Office: [address] [place] [address]  *Surgical coordination, Infusion treatments, Medical Oncology consultation & Radiation Oncology if  indicated through [facility].   Office P# [phone]    Dr Malorie Butler- [facility]- Office: [address] [place] [address]  *Surgical coordination can be scheduled at [place] or [facility]. Infusion treatments, Medical Oncology consultation & Radiation Oncology if indicated through [facility] or [facility] based on patient or physician preference.   Office P# [phone]    Dr Eleni Tovar- [facility]/ [facility]-    Office: [address] [place] [address]    [address] [place], [address]  Surgical coordination can be scheduled at [facility] or [facility]. Infusion treatments, Medical Oncology consultation & Radiation Oncology if indicated through [facility] based, [facility], or location of patient or physician preference.     Office P# [phone]    More information can be viewed through the [facility] website [email or web address] --> Quick Links tab --> Physician Directory *Find a Doc*       Sincerely,    Sarah Carpio RN, BSN  Radiology and Breast Care Services  [facility]  P# [phone]          Progress Notes:

## 2024-01-29 NOTE — DISCHARGE INSTR - OTHER ORDERS
POST LARGE CORE BREAST BIOPSY PATIENT INFORMATION      Place an ice pack inside your shirt over the top of the gauze dressing every hour for 20 minutes (20 minutes on, 60 minutes off). Do this until bedtime.    Do not shower or bathe until the following afternoon Tuesday 01/30/2024 @ 12pm.    You may bathe your chest carefully with the steri-strips in place.  Use caution so as to not loosen them.  The steri-strips are recommended to stay in place for 3-5 days to allow for adequate healing of the biopsy incision.    You may have mild discomfort, and you may have some bruising where the needle entered the skin.  This should clear within 5-7 days post-procedure.    If you need medicine for discomfort, take acetaminophen products such as Tylenol. You may also take Advil or Motrin products. Avoid using any aspirin based products as these may potentially increase the risk for bruising or bleeding at the procedure site.    Do not participate in strenuous activities such as-tennis, aerobics, skiing, weight lifting > 10 lbs, etc. for 24 hours. Refrain from swimming/soaking for 72 hours to reduce any risk for infection.    Wearing a bra for sleeping may be more comfortable for the first 24-48 hours.    Watch for continued bleeding, pain or fever over 101. If any of these symptoms occur, please contact our breast nurse navigator at the location where your biopsy was performed.    During normal business hours (7:30 am-4:00 pm) please call the nurse navigator at the site where your   procedure was performed:    [facility]:  [phone], [phone] or [phone]  [facility]:     [phone] or [phone]  [facility]:    [phone] or [phone]  [facility]:   [phone] or [phone]  [facility]:   [phone]  [facility]:     [phone]              After 4 PM - please call  your physician or go to the nearest Emergency Department location.            9.         The final results of your biopsy are usually available within one week.

## 2024-01-29 NOTE — PROGRESS NOTES
Procedure type:    __x___ Ultrasound guided _____ Stereotactic (Mammography Guided)    Breast:    _____ Left Breast biopsy  __x___ Right Breast/ Anterior chest biopsy    Time-out called @ 11:37am and procedure confirmed with patient Pedro Irving, myself Sarah Carpio RN, Aleks Lindsey MD, and Amber Tapia RDMS.     Location: Right 12 o'clock 7cm FN    Needle: 12G marquee    # of passes: 4    Clip: Marsha  Reflector    Performed by: Aleks Lindsey MD    Pressure held for 5 minutes by: Sarah Carpio RN    Marsha  reflector activity audibly confirmed with hand-held  Check device over patient's Right chest by Dr Lindsey @ 11:46am.    Steri Strips:    __x___ yes ______ no    Band aid:    _____ yes __x___ no  *Gauze and tape in place over steri-strip bandages at breast biopsy incision site.    Tolerated procedure:    __x___ yes _____ no

## 2024-01-30 ENCOUNTER — TELEPHONE (OUTPATIENT)
Dept: RADIOLOGY | Facility: HOSPITAL | Age: 49
End: 2024-01-30

## 2024-01-30 NOTE — PROGRESS NOTES
Post-procedure call completed: Tuesday 01/30/2024    Bleeding: _____ yes __x__ no    Pain: _____ yes ___x___ no    *Patient reports minimal to no pain. He reports using the ice pack Monday evening 01/29/2024 to help with pain and risk reduction of post-procedural bruising/bleeding/hematoma development. He has not required taking any additional medications for pain control thus far, but is aware he may use OTC pain control medications as needed (tylenol, motrin, ibuprofen, advil all ok to use; avoid aspirin based products due to bleeding risk).    Redness/Swelling: ______ yes ___x___ no    Band aid removed: __x___ yes _____no    *Patient does report removing gauze dressing and tape earlier this morning. Steri-strips are in tact and he has no concerns regarding the biopsy site at this time. He is aware not to scrub near biopsy site, so as to avoid loosening the steri-strips. He is aware to avoid prolonged soaking, swimming, or tub baths until biopsy incision is fully healed to reduce any risk for infection.    Steri-Strips intact: ___x___ yes _____ no    *Patient instructed to leave steri-strips in place until Friday evening 02/02/2024. If steri-strips do not fall off on their own at that time, it would be appropriate to remove.    Patient with no additional questions at this time. I relayed to Abe that either myself or Dr Lindsey will call when biopsy pathology results are available. Patient does have my name & office phone number if he has any questions or concerns in the interim of time until his pathology results are finalized.

## 2024-01-31 ENCOUNTER — TELEPHONE (OUTPATIENT)
Dept: RADIOLOGY | Facility: HOSPITAL | Age: 49
End: 2024-01-31

## 2024-01-31 PROCEDURE — 88342 IMHCHEM/IMCYTCHM 1ST ANTB: CPT | Performed by: PATHOLOGY

## 2024-01-31 PROCEDURE — 88305 TISSUE EXAM BY PATHOLOGIST: CPT | Performed by: PATHOLOGY

## 2024-01-31 PROCEDURE — 88341 IMHCHEM/IMCYTCHM EA ADD ANTB: CPT | Performed by: PATHOLOGY

## 2024-02-01 NOTE — PROGRESS NOTES
February 1, 2024   Patient: Pedro Irving   YOB: 1975   Date of Visit: 1/29/2024     Dear Dr. Hampton & Abe,    Monday 01/29/2024 US Guided biopsy results are BENIGN. Per radiologist Dr Lindsey, he recommends a 6 month follow-up Right Diagnostic Mammogram to assess for stability of Right Breast/ Anterior Chest fibrosis and fat necrosis. Due to your family history Abe, he is requesting that we have a surgical oncologist/ breast specialist see you for formal consultation, to help closer manage your care and clinical symptoms, at least for the next few interim years.    I am including a list of our [facility] specialists for you to review. Once you have had a chance to review information regarding our specialists, if you could please touch base with Dr Hampton so that he may then route an electronic referral to the physician's scheduling team for you to establish care/ formal consultation.    Here is a completed list of our practicing breast surgeons, and surgical oncologists in the [facility] so that you may review. We have a few specialists throughout our [facility] network & I just wanted to forward a written copy of the surgeons names, office information, and campuses at which their services are available, as it may be easier to have on hand in text to reflect our discussion over the phone earlier this morning.    Dr Bill Cole- [facility], Office: [address] [place] [address]  *Surgical coordination, Medical Oncology consultation, & Infusion treatments would be coordinated through [facility]. Radiation Oncology if indicated through [facility] or [facility] based on patient or physician preference.  Office P# [phone]    Dr Jone Correa- [facility], Office: [address] [place] [address]  *Surgical coordination, Infusion treatments, Medical Oncology consultation & Radiation Oncology if  indicated through [facility].   Office P# [phone]    Dr Malorie Butler- [facility]- Office: [address] [place] [address]  *Surgical coordination can be scheduled at [place] or [facility]. Infusion treatments, Medical Oncology consultation & Radiation Oncology if indicated through [facility] or [facility] based on patient or physician preference.   Office P# [phone]    Dr Eleni Tovar- [facility]/ [facility]-    Office: [address] [place] [address]    [address] [place], [address]  Surgical coordination can be scheduled at [facility] or [facility]. Infusion treatments, Medical Oncology consultation & Radiation Oncology if indicated through [facility] based, [facility], or location of patient or physician preference.     Office P# [phone]    More information can be viewed through the [facility] website [email or web address] --> Quick Links tab --> Physician Directory *Find a Doc*       Sincerely,    Sarah Carpio RN, BSN  Radiology and Breast Care Services  [facility]  P# [phone]

## 2024-02-02 DIAGNOSIS — N63.10 MASS OF RIGHT BREAST, UNSPECIFIED QUADRANT: Primary | ICD-10-CM

## 2024-02-21 PROBLEM — Z12.5 PROSTATE CANCER SCREENING: Status: RESOLVED | Noted: 2018-09-04 | Resolved: 2024-02-21

## 2024-02-21 PROBLEM — Z00.00 HEALTHCARE MAINTENANCE: Status: RESOLVED | Noted: 2018-02-23 | Resolved: 2024-02-21

## 2024-02-21 PROBLEM — Z13.83 SCREENING FOR CARDIOVASCULAR, RESPIRATORY, AND GENITOURINARY DISEASES: Status: RESOLVED | Noted: 2018-09-04 | Resolved: 2024-02-21

## 2024-02-21 PROBLEM — Z13.6 SCREENING FOR CARDIOVASCULAR, RESPIRATORY, AND GENITOURINARY DISEASES: Status: RESOLVED | Noted: 2018-09-04 | Resolved: 2024-02-21

## 2024-02-21 PROBLEM — Z13.89 SCREENING FOR CARDIOVASCULAR, RESPIRATORY, AND GENITOURINARY DISEASES: Status: RESOLVED | Noted: 2018-09-04 | Resolved: 2024-02-21

## 2024-03-18 ENCOUNTER — CONSULT (OUTPATIENT)
Dept: SURGERY | Facility: CLINIC | Age: 49
End: 2024-03-18
Payer: COMMERCIAL

## 2024-03-18 VITALS
BODY MASS INDEX: 44.1 KG/M2 | HEIGHT: 71 IN | OXYGEN SATURATION: 96 % | TEMPERATURE: 97.2 F | HEART RATE: 61 BPM | DIASTOLIC BLOOD PRESSURE: 71 MMHG | SYSTOLIC BLOOD PRESSURE: 130 MMHG | WEIGHT: 315 LBS

## 2024-03-18 DIAGNOSIS — R92.8 ABNORMAL MAMMOGRAM OF RIGHT BREAST: Primary | ICD-10-CM

## 2024-03-18 PROCEDURE — 99203 OFFICE O/P NEW LOW 30 MIN: CPT | Performed by: SURGERY

## 2024-03-18 NOTE — PROGRESS NOTES
"[facility] History and Physical Note:    Assessment:  Right breast mass biopsied and is benign.  Recommend 6-month repeat diagnostic mammogram.    Pertinent labs reviewed  Reviewed the pathology note  Pertinent images and available reads personally reviewed  Reviewed the bilateral diagnostic mammogram images and report  Pertinent notes reviewed  Reviewed the postbiopsy note    Plan:  Right breast diagnostic mammogram is already been ordered for August 2024.  Follow-up with me early September    Chief Complaint:  \"For the right breast mass\"    HPI  Patient is a 49-year-old gentleman with super morbid obesity (BMI 59), hypertension and obstructive sleep apnea.  Referred to my office for a lump in the right breast.  He first noticed this in December.  It was tender but then has improved somewhat since then.    A bilateral diagnostic mammogram was ordered and performed on 6 January 2024:  FINDINGS:   RIGHT  1) MASS [A]  Mammo diagnostic bilateral w 3d & cad: There is a 57 mm x 53 mm high density, irregularly shaped mass with spiculated margins seen in the upper central region of the right breast in the middle depth. The mass correlates with the palpable mass reported by the patient. There are also associated rim and dystrophic calcifications centrally within the mass having the appearance of fat necrosis.  Although the patient does have a history of remote severe trauma to the breast, ultrasound-guided core biopsy recommended to exclude malignancy as this is newly palpable to the patient..  US breast right limited (diagnostic): There is a 42 mm x 26 mm x 40 mm irregularly shaped, heterogeneous mass with angular margins with shadowing seen in the right breast at 12 o'clock, 7 cm from the nipple. The mass correlates with the palpable mass reported by the patient. The mass correlates with the prior mammogram finding.  Shadowing echogenic foci within the mass correspond to dystrophic calcifications " seen mammographically.  Evaluation of the axilla confirms no pathologic lymphadenopathy.     Left  Mammo diagnostic bilateral w 3d & cad  There are no suspicious masses, grouped microcalcifications or areas of unexplained architectural distortion. The skin and nipple areolar complex are unremarkable.   The above findings and recommendations were discussed with the patient at the time of the study who will be contacted by our nurse navigator to arrange image guided biopsy.  IMPRESSION:  Ultrasound-guided core biopsy recommended for highly suspicious mass 12:00 right breast a correlate to the palpable and mammographic abnormalities.  ASSESSMENT/BI-RADS CATEGORY:  Left: 1 - Negative  Right: 5 - Highly Suggestive of Malignancy  Overall: 5 - Highly Suggestive of Malignancy  RECOMMENDATION:       - Ultrasound-guided breast biopsy for the right breast.       - Clinical management for the left breast.     A biopsy of the lesion noted in the right breast was performed.  Pathology report:  Final Diagnosis   A. Breast, Right, us guided right breast bx 1200 7 cm fn, 4 passes 12g marquee:  - Benign fibroadipose tissue with organizing fat necrosis, fibrosis and calcification. See note.  - No epithelial elements are identified.  Pan keratin and  stain is negative.     Postbiopsy note by radiology:  PATHOLOGY RESULTS:   1) Mass [A] (Right; Upper Central; 12 o'clock; Middle; 7 cm from nipple)  The pathology results indicate a Benign finding with benign adipose tissue , fat necrosis, benign calcifications, and fibrosis findings which can be seen post traumatically.   Radiology and pathology are concordant.  Because the patient's trauma was remote and the lesion is now only palpable, surgical consultation as well as 6-month follow-up diagnostic mammogram recommended to assess stability of the non-calcified asymmetric tissue.  RECOMMENDATION:       - Clinical Management for the right breast.       - Diagnostic Mammogram in 6  months for the right breast.        PMH:  Past Medical History:   Diagnosis Date    Acid reflux     Cellulitis     RESOLVED:5/6/15    CPAP (continuous positive airway pressure) dependence     Hypertension     Loose body in knee     RESOLVED:11/15/16    Neoplasm of bone 01/28/2008    LAST ASSESSED: 1/28/08    Patellar tendonitis     UNSPECIFIED LATERALITY, LAST ASSESSED: 10/19/12    Seasonal allergies     Seborrheic dermatitis 01/20/2010    LAST ASSESSED: 1/20/10    Sleep apnea     wears cpap at night    Sprain and strain     LATE EFFECT; RESOLVED: 5/6/15       PSH:  Past Surgical History:   Procedure Laterality Date    BREAST BIOPSY Right 01/29/2024    CHOLECYSTECTOMY      FINGER FRACTURE SURGERY Left 1996    pinky    KNEE ARTHROSCOPY Bilateral     left knee growth removed    KNEE SURGERY Right 1992    LIPOMA RESECTION      left side    [place] EGD TRANSORAL BIOPSY SINGLE/MULTIPLE N/A 02/22/2016    Procedure: ESOPHAGOGASTRODUODENOSCOPY (EGD);  Surgeon: Neel Ross MD;  Location: [facility] GI [facility];  Service: Gastroenterology    US GUIDED BREAST BIOPSY RIGHT COMPLETE Right 1/29/2024       Home Meds:  Current Outpatient Medications on File Prior to Visit   Medication Sig Dispense Refill    albuterol (ProAir HFA) 90 mcg/act inhaler Inhale 2 puffs every 6 (six) hours as needed for wheezing 54 g 5    Ascorbic Acid (VITAMIN C) 1000 MG tablet Take 1,000 mg by mouth      cetirizine (ZyrTEC) 10 mg tablet Take 1 tablet by mouth daily      Cholecalciferol (Vitamin D3) 50 MCG (2000 UT) TABS Take 2,000 Units by mouth daily      Cyanocobalamin (Vitamin B-12) 5000 MCG SUBL Place under the tongue      diltiazem (CARDIZEM CD) 120 mg 24 hr capsule TAKE 1 CAPSULE DAILY 90 capsule 1    famotidine (PEPCID) 20 mg tablet TAKE 1 TABLET TWICE A DAY  AS NEEDED FOR REFLUX 180 tablet 1    fluticasone (FLONASE) 50 mcg/act nasal spray 1 spray into each nostril if needed      losartan-hydrochlorothiazide (HYZAAR) 100-12.5 MG per tablet TAKE 1 TABLET  DAILY 90 tablet 1    montelukast (SINGULAIR) 10 mg tablet TAKE 1 TABLET DAILY 90 tablet 1    Omega-3 1400 MG CAPS Take 1 capsule by mouth daily.      propranolol (INDERAL LA) 160 mg TAKE 1 CAPSULE DAILY 90 capsule 1    Turmeric 500 MG CAPS Take by mouth      valACYclovir (VALTREX) 1,000 mg tablet Take 1 tablet (1,000 mg total) by mouth 2 (two) times a day for 2 days (Patient taking differently: Take 1,000 mg by mouth if needed) 30 tablet 3    vitamin E, tocopherol, 400 units capsule Take 400 Units by mouth daily.      zinc gluconate 50 mg tablet Take 50 mg by mouth daily       No current facility-administered medications on file prior to visit.       Allergies:  No Known Allergies    Social Hx:  Social History     Socioeconomic History    Marital status: /Civil Union     Spouse name: Not on file    Number of children: Not on file    Years of education: Not on file    Highest education level: Not on file   Occupational History    Not on file   Tobacco Use    Smoking status: Never    Smokeless tobacco: Never   Vaping Use    Vaping status: Never Used   Substance and Sexual Activity    Alcohol use: Never    Drug use: No    Sexual activity: Not on file   Other Topics Concern    Not on file   Social History Narrative    Not on file     Social Determinants of Health     Financial Resource Strain: Unknown (9/27/2022)    Received from [facility]    Overall Financial Resource Strain (CARDIA)     Difficulty of Paying Living Expenses: Patient declined   Food Insecurity: Unknown (9/27/2022)    Received from [facility]    Hunger Vital Sign     Worried About Running Out of Food in the Last Year: Never true     Ran Out of Food in the Last Year: Not on file   Transportation Needs: Unknown (9/27/2022)    Received from [facility]    PRAPARE - Transportation     Lack of Transportation (Medical): No     Lack of Transportation (Non-Medical): Not on file   Physical Activity: Not on file   Stress: Not on file   Social  Connections: Unknown (9/27/2022)    Received from [facility]    Social Connection and Isolation Panel [NHANES]     Frequency of Communication with Friends and Family: More than three times a week     Frequency of Social Gatherings with Friends and Family: Not on file     Attends Gnosticism Services: Not on file     Active Member of Clubs or Organizations: Not on file     Attends Club or Organization Meetings: Not on file     Marital Status: Not on file   Intimate Partner Violence: Unknown (9/27/2022)    Received from [facility]    Humiliation, Afraid, Rape, and Kick questionnaire     Fear of Current or Ex-Partner: No     Emotionally Abused: Not on file     Physically Abused: Not on file     Sexually Abused: Not on file   Housing Stability: Unknown (9/27/2022)    Received from [facility]    Housing Stability Vital Sign     Unable to Pay for Housing in the Last Year: No     Number of Places Lived in the Last Year: Not on file     Unstable Housing in the Last Year: Not on file        Family Hx:    Family History   Problem Relation Age of Onset    Breast cancer Mother 38    Atrial fibrillation Father     Sleep apnea Father     Cancer Maternal Grandmother     Prostate cancer Maternal Grandfather         maybe late 60's    Arthritis Family     Breast cancer Family     Stomach cancer Family     Liver cancer Family          Review of Systems   Constitutional:  Negative for chills and fever.   Respiratory:          Obstructive sleep apnea   Cardiovascular:         Hypertension   Gastrointestinal: Negative.    Genitourinary: Negative.    Musculoskeletal: Negative.    Neurological: Negative.    Hematological: Negative.    All other systems reviewed and are negative.    There were no vitals taken for this visit.    Physical Exam  Vitals reviewed.   Constitutional:       General: He is not in acute distress.     Appearance: He is obese.   Cardiovascular:      Rate and Rhythm: Normal rate.   Pulmonary:      Effort:  Pulmonary effort is normal.   Chest:          Comments: Patient has a mass the size of a pea, well-circumscribed, mobile superficial subcutaneous tissue of the right breast 1 o'clock position most consistent with a small lipoma.  No other masses.  Abdominal:      General: There is no distension.   Musculoskeletal:         General: Normal range of motion.   Skin:     General: Skin is warm and dry.   Neurological:      Mental Status: He is alert.       Pertinent labs reviewed  Reviewed the pathology note  Pertinent images and available reads personally reviewed  Reviewed the bilateral diagnostic mammogram images and report  Pertinent notes reviewed  Reviewed the postbiopsy note     Bill Cole MD FACS  [facility]  [phone]

## 2024-04-16 ENCOUNTER — TELEPHONE (OUTPATIENT)
Age: 49
End: 2024-04-16

## 2024-04-16 DIAGNOSIS — K21.00 CHRONIC REFLUX ESOPHAGITIS: ICD-10-CM

## 2024-04-16 NOTE — TELEPHONE ENCOUNTER
Patients GI provider:  Dr. Ross    Number to return call: [phone]    Reason for call: Pt calling requesting refill on Pepcid. Pt scheduled for appt.    Scheduled procedure/appointment date if applicable: Apt 11/1/2024

## 2024-04-17 RX ORDER — FAMOTIDINE 20 MG/1
20 TABLET, FILM COATED ORAL 2 TIMES DAILY PRN
Qty: 180 TABLET | Refills: 1 | Status: SHIPPED | OUTPATIENT
Start: 2024-04-17

## 2024-04-18 DIAGNOSIS — K21.00 CHRONIC REFLUX ESOPHAGITIS: ICD-10-CM

## 2024-04-18 RX ORDER — FAMOTIDINE 20 MG/1
TABLET, FILM COATED ORAL
Qty: 180 TABLET | Refills: 0 | OUTPATIENT
Start: 2024-04-18

## 2024-04-30 ENCOUNTER — APPOINTMENT (OUTPATIENT)
Dept: LAB | Facility: HOSPITAL | Age: 49
End: 2024-04-30
Payer: COMMERCIAL

## 2024-04-30 DIAGNOSIS — R73.03 PREDIABETES: ICD-10-CM

## 2024-04-30 LAB
EST. AVERAGE GLUCOSE BLD GHB EST-MCNC: 146 MG/DL
HBA1C MFR BLD: 6.7 %

## 2024-04-30 PROCEDURE — 36415 COLL VENOUS BLD VENIPUNCTURE: CPT

## 2024-04-30 PROCEDURE — 83036 HEMOGLOBIN GLYCOSYLATED A1C: CPT

## 2024-04-30 PROCEDURE — 80053 COMPREHEN METABOLIC PANEL: CPT

## 2024-05-04 ENCOUNTER — TELEPHONE (OUTPATIENT)
Dept: FAMILY MEDICINE CLINIC | Facility: CLINIC | Age: 49
End: 2024-05-04

## 2024-05-08 LAB
ALBUMIN SERPL BCP-MCNC: 4.1 G/DL (ref 3.5–5)
ALP SERPL-CCNC: 66 U/L (ref 34–104)
ALT SERPL W P-5'-P-CCNC: 19 U/L (ref 7–52)
ANION GAP SERPL CALCULATED.3IONS-SCNC: 6 MMOL/L (ref 4–13)
AST SERPL W P-5'-P-CCNC: 14 U/L (ref 13–39)
BILIRUB SERPL-MCNC: 0.56 MG/DL (ref 0.2–1)
BUN SERPL-MCNC: 17 MG/DL (ref 5–25)
CALCIUM SERPL-MCNC: 9.3 MG/DL (ref 8.4–10.2)
CHLORIDE SERPL-SCNC: 101 MMOL/L (ref 96–108)
CO2 SERPL-SCNC: 34 MMOL/L (ref 21–32)
CREAT SERPL-MCNC: 0.78 MG/DL (ref 0.6–1.3)
GFR SERPL CREATININE-BSD FRML MDRD: 105 ML/MIN/1.73SQ M
GLUCOSE P FAST SERPL-MCNC: 126 MG/DL (ref 65–99)
POTASSIUM SERPL-SCNC: 3.9 MMOL/L (ref 3.5–5.3)
PROT SERPL-MCNC: 7.8 G/DL (ref 6.4–8.4)
SODIUM SERPL-SCNC: 141 MMOL/L (ref 135–147)

## 2024-05-10 ENCOUNTER — OFFICE VISIT (OUTPATIENT)
Dept: FAMILY MEDICINE CLINIC | Facility: CLINIC | Age: 49
End: 2024-05-10
Payer: COMMERCIAL

## 2024-05-10 VITALS
DIASTOLIC BLOOD PRESSURE: 68 MMHG | HEART RATE: 82 BPM | WEIGHT: 315 LBS | RESPIRATION RATE: 18 BRPM | HEIGHT: 71 IN | SYSTOLIC BLOOD PRESSURE: 128 MMHG | TEMPERATURE: 98 F | BODY MASS INDEX: 44.1 KG/M2

## 2024-05-10 DIAGNOSIS — Z13.220 SCREENING FOR LIPID DISORDERS: ICD-10-CM

## 2024-05-10 DIAGNOSIS — Z12.5 PROSTATE CANCER SCREENING: ICD-10-CM

## 2024-05-10 DIAGNOSIS — Z13.1 SCREENING FOR DIABETES MELLITUS (DM): ICD-10-CM

## 2024-05-10 DIAGNOSIS — I10 BENIGN ESSENTIAL HYPERTENSION: ICD-10-CM

## 2024-05-10 DIAGNOSIS — N63.10 MASS OF RIGHT BREAST, UNSPECIFIED QUADRANT: ICD-10-CM

## 2024-05-10 DIAGNOSIS — R73.03 PREDIABETES: ICD-10-CM

## 2024-05-10 DIAGNOSIS — R73.09 ELEVATED GLUCOSE: Primary | ICD-10-CM

## 2024-05-10 DIAGNOSIS — E66.01 MORBID OBESITY (HCC): ICD-10-CM

## 2024-05-10 DIAGNOSIS — J31.0 CHRONIC RHINITIS: ICD-10-CM

## 2024-05-10 PROCEDURE — 99214 OFFICE O/P EST MOD 30 MIN: CPT | Performed by: FAMILY MEDICINE

## 2024-05-10 RX ORDER — DILTIAZEM HYDROCHLORIDE 120 MG/1
120 CAPSULE, COATED, EXTENDED RELEASE ORAL DAILY
Qty: 90 CAPSULE | Refills: 1 | Status: SHIPPED | OUTPATIENT
Start: 2024-05-10

## 2024-05-10 RX ORDER — MONTELUKAST SODIUM 10 MG/1
10 TABLET ORAL DAILY
Qty: 90 TABLET | Refills: 1 | Status: SHIPPED | OUTPATIENT
Start: 2024-05-10

## 2024-05-10 NOTE — PROGRESS NOTES
Assessment/Plan:    No problem-specific Assessment & Plan notes found for this encounter.    DM2 dx/tx criteria advised  Suggest diet/exercise/wt loss, diet education, possible bariatrics surgery eval  Been to wt mgmt in past  Check A1c/fbs in 3m to see if needs to start tx    Labs for obesity ordered    Htn stable    Rhinitis stable, continue flonase    Breast mass, bx benign, saw surgeon, f/u mammo ordered     Diagnoses and all orders for this visit:    Elevated glucose  -     Comprehensive metabolic panel; Future  -     Hemoglobin A1C; Future  -     Comprehensive metabolic panel  -     Hemoglobin A1C    Screening for diabetes mellitus (DM)  -     Comprehensive metabolic panel; Future  -     Comprehensive metabolic panel    Screening for lipid disorders  -     Lipid Panel with Direct LDL reflex; Future  -     Lipid Panel with Direct LDL reflex    Prostate cancer screening  -     PSA, Total Screen; Future    Prediabetes  -     Hemoglobin A1C; Future  -     Hemoglobin A1C    Morbid obesity (HCC)  -     TSH, 3rd generation; Future  -     Testosterone; Future  -     Cortisol Level, AM Specimen; Future  -     TSH, 3rd generation  -     Testosterone  -     Cortisol Level, AM Specimen    Benign essential hypertension  -     diltiazem (CARDIZEM CD) 120 mg 24 hr capsule; Take 1 capsule (120 mg total) by mouth daily    Chronic rhinitis  -     montelukast (SINGULAIR) 10 mg tablet; Take 1 tablet (10 mg total) by mouth daily        Return in about 6 months (around 11/10/2024).    Subjective:      Patient ID: Pedro Irving is a 49 y.o. male.    Chief Complaint   Patient presents with    Follow-up     Carrie Roth, CMA        HPI  Glu 126  A1c 6.7  Has gained more wt    Breast bx path reviewed  F/u mammo aware    The following portions of the patient's history were reviewed and updated as appropriate: allergies, current medications, past family history, past medical history, past social history, past surgical history and  "problem list.    Review of Systems   Constitutional:  Negative for fever and unexpected weight change.   Respiratory:  Negative for shortness of breath.          Current Outpatient Medications   Medication Sig Dispense Refill    albuterol (ProAir HFA) 90 mcg/act inhaler Inhale 2 puffs every 6 (six) hours as needed for wheezing 54 g 5    Ascorbic Acid (VITAMIN C) 1000 MG tablet Take 1,000 mg by mouth      cetirizine (ZyrTEC) 10 mg tablet Take 1 tablet by mouth daily      Cholecalciferol (Vitamin D3) 50 MCG (2000 UT) TABS Take 2,000 Units by mouth daily      Cyanocobalamin (Vitamin B-12) 5000 MCG SUBL Place under the tongue      diltiazem (CARDIZEM CD) 120 mg 24 hr capsule Take 1 capsule (120 mg total) by mouth daily 90 capsule 1    famotidine (PEPCID) 20 mg tablet Take 1 tablet (20 mg total) by mouth 2 (two) times a day as needed for heartburn 180 tablet 1    fluticasone (FLONASE) 50 mcg/act nasal spray 1 spray into each nostril if needed      losartan-hydrochlorothiazide (HYZAAR) 100-12.5 MG per tablet TAKE 1 TABLET DAILY 90 tablet 1    montelukast (SINGULAIR) 10 mg tablet Take 1 tablet (10 mg total) by mouth daily 90 tablet 1    Omega-3 1400 MG CAPS Take 1 capsule by mouth daily.      propranolol (INDERAL LA) 160 mg TAKE 1 CAPSULE DAILY 90 capsule 1    Turmeric 500 MG CAPS Take by mouth      valACYclovir (VALTREX) 1,000 mg tablet Take 1 tablet (1,000 mg total) by mouth 2 (two) times a day for 2 days (Patient taking differently: Take 1,000 mg by mouth if needed) 30 tablet 3    vitamin E, tocopherol, 400 units capsule Take 400 Units by mouth daily.      zinc gluconate 50 mg tablet Take 50 mg by mouth daily       No current facility-administered medications for this visit.       Objective:    /68   Pulse 82   Temp 98 °F (36.7 °C)   Resp 18   Ht 5' 10.5\" (1.791 m)   Wt (!) 197 kg (434 lb)   BMI 61.39 kg/m²        Physical Exam  Vitals and nursing note reviewed.   Constitutional:       General: He is not in " acute distress.     Appearance: He is well-developed. He is obese. He is not ill-appearing.   HENT:      Head: Normocephalic.      Right Ear: Tympanic membrane normal.      Left Ear: Tympanic membrane normal.   Eyes:      General: No scleral icterus.     Conjunctiva/sclera: Conjunctivae normal.   Cardiovascular:      Rate and Rhythm: Normal rate and regular rhythm.      Heart sounds: No murmur heard.  Pulmonary:      Effort: Pulmonary effort is normal. No respiratory distress.      Breath sounds: No wheezing.   Abdominal:      Palpations: Abdomen is soft.   Musculoskeletal:         General: No deformity.      Cervical back: Neck supple.   Skin:     General: Skin is warm and dry.      Coloration: Skin is not pale.   Neurological:      Mental Status: He is alert.      Motor: No weakness.      Gait: Gait normal.   Psychiatric:         Mood and Affect: Mood normal.         Behavior: Behavior normal.         Thought Content: Thought content normal.                Jorge Hampton DO

## 2024-08-08 ENCOUNTER — HOSPITAL ENCOUNTER (OUTPATIENT)
Dept: RADIOLOGY | Facility: HOSPITAL | Age: 49
Discharge: HOME | End: 2024-08-08
Attending: FAMILY MEDICINE
Payer: COMMERCIAL

## 2024-08-08 VITALS — HEIGHT: 71 IN | BODY MASS INDEX: 44.1 KG/M2 | WEIGHT: 315 LBS

## 2024-08-08 DIAGNOSIS — R92.8 ABNORMAL MAMMOGRAM OF RIGHT BREAST: Primary | ICD-10-CM

## 2024-08-08 DIAGNOSIS — N63.10 MASS OF RIGHT BREAST, UNSPECIFIED QUADRANT: ICD-10-CM

## 2024-08-08 PROCEDURE — G0279 TOMOSYNTHESIS, MAMMO: HCPCS

## 2024-08-08 PROCEDURE — 77065 DX MAMMO INCL CAD UNI: CPT

## 2024-08-13 ENCOUNTER — APPOINTMENT (OUTPATIENT)
Dept: LAB | Facility: HOSPITAL | Age: 49
End: 2024-08-13
Attending: FAMILY MEDICINE
Payer: COMMERCIAL

## 2024-08-13 DIAGNOSIS — E66.01 MORBID OBESITY (HCC): ICD-10-CM

## 2024-08-13 DIAGNOSIS — R73.03 PREDIABETES: ICD-10-CM

## 2024-08-13 DIAGNOSIS — Z13.1 SCREENING FOR DIABETES MELLITUS (DM): ICD-10-CM

## 2024-08-13 LAB
ALBUMIN SERPL BCG-MCNC: 3.9 G/DL (ref 3.5–5)
ALP SERPL-CCNC: 64 U/L (ref 34–104)
ALT SERPL W P-5'-P-CCNC: 19 U/L (ref 7–52)
ANION GAP SERPL CALCULATED.3IONS-SCNC: 8 MMOL/L (ref 4–13)
AST SERPL W P-5'-P-CCNC: 14 U/L (ref 13–39)
BILIRUB SERPL-MCNC: 0.63 MG/DL (ref 0.2–1)
BUN SERPL-MCNC: 14 MG/DL (ref 5–25)
CALCIUM SERPL-MCNC: 9.2 MG/DL (ref 8.4–10.2)
CHLORIDE SERPL-SCNC: 99 MMOL/L (ref 96–108)
CO2 SERPL-SCNC: 31 MMOL/L (ref 21–32)
CORTIS AM PEAK SERPL-MCNC: 6.4 UG/DL (ref 6.7–22.6)
CREAT SERPL-MCNC: 0.67 MG/DL (ref 0.6–1.3)
EST. AVERAGE GLUCOSE BLD GHB EST-MCNC: 151 MG/DL
GFR SERPL CREATININE-BSD FRML MDRD: 112 ML/MIN/1.73SQ M
GLUCOSE P FAST SERPL-MCNC: 120 MG/DL (ref 65–99)
HBA1C MFR BLD: 6.9 %
POTASSIUM SERPL-SCNC: 3.5 MMOL/L (ref 3.5–5.3)
PROT SERPL-MCNC: 7.5 G/DL (ref 6.4–8.4)
SODIUM SERPL-SCNC: 138 MMOL/L (ref 135–147)
TESTOST SERPL-MSCNC: 202 NG/DL
TSH SERPL DL<=0.05 MIU/L-ACNC: 1.35 UIU/ML (ref 0.45–4.5)

## 2024-08-13 PROCEDURE — 82533 TOTAL CORTISOL: CPT

## 2024-08-13 PROCEDURE — 84403 ASSAY OF TOTAL TESTOSTERONE: CPT

## 2024-08-13 PROCEDURE — 36415 COLL VENOUS BLD VENIPUNCTURE: CPT

## 2024-08-13 PROCEDURE — 80053 COMPREHEN METABOLIC PANEL: CPT

## 2024-08-13 PROCEDURE — 84443 ASSAY THYROID STIM HORMONE: CPT

## 2024-08-13 PROCEDURE — 83036 HEMOGLOBIN GLYCOSYLATED A1C: CPT

## 2024-10-13 DIAGNOSIS — I10 BENIGN ESSENTIAL HYPERTENSION: ICD-10-CM

## 2024-10-13 RX ORDER — LOSARTAN POTASSIUM AND HYDROCHLOROTHIAZIDE 12.5; 1 MG/1; MG/1
1 TABLET ORAL DAILY
Qty: 90 TABLET | Refills: 1 | Status: SHIPPED | OUTPATIENT
Start: 2024-10-13

## 2024-10-13 RX ORDER — PROPRANOLOL HYDROCHLORIDE 160 MG/1
160 CAPSULE, EXTENDED RELEASE ORAL DAILY
Qty: 90 CAPSULE | Refills: 1 | Status: SHIPPED | OUTPATIENT
Start: 2024-10-13

## 2024-10-14 DIAGNOSIS — K21.00 CHRONIC REFLUX ESOPHAGITIS: ICD-10-CM

## 2024-10-14 RX ORDER — FAMOTIDINE 20 MG/1
TABLET, FILM COATED ORAL
Qty: 180 TABLET | Refills: 0 | Status: SHIPPED | OUTPATIENT
Start: 2024-10-14

## 2024-10-14 NOTE — TELEPHONE ENCOUNTER
Patient called to request a refill for their Famotidine advised a refill was requested on 10/14 from pharmacy and is pending approval. Patient verbalized understanding and is in agreement.     He only has enough to last until tomorrow morning. Will send HP message.

## 2024-11-01 ENCOUNTER — OFFICE VISIT (OUTPATIENT)
Dept: GASTROENTEROLOGY | Facility: CLINIC | Age: 49
End: 2024-11-01
Payer: COMMERCIAL

## 2024-11-01 VITALS
OXYGEN SATURATION: 93 % | WEIGHT: 315 LBS | HEIGHT: 71 IN | SYSTOLIC BLOOD PRESSURE: 133 MMHG | DIASTOLIC BLOOD PRESSURE: 87 MMHG | HEART RATE: 88 BPM | BODY MASS INDEX: 44.1 KG/M2

## 2024-11-01 DIAGNOSIS — K21.00 CHRONIC REFLUX ESOPHAGITIS: Primary | ICD-10-CM

## 2024-11-01 DIAGNOSIS — E66.01 MORBID OBESITY (HCC): ICD-10-CM

## 2024-11-01 PROCEDURE — 99213 OFFICE O/P EST LOW 20 MIN: CPT | Performed by: INTERNAL MEDICINE

## 2024-11-01 RX ORDER — FAMOTIDINE 20 MG/1
20 TABLET, FILM COATED ORAL 2 TIMES DAILY
Qty: 180 TABLET | Refills: 6 | Status: SHIPPED | OUTPATIENT
Start: 2024-11-01

## 2024-11-01 NOTE — ASSESSMENT & PLAN NOTE
-Symptoms well-controlled, negative for Maldonado's on last endoscopy  -Continue twice daily famotidine  Continue with dietary lifestyle modification  -Repeat EGD in 2027 at the same time of his colonoscopy, there is some concern of some pre-Maldonado's changes on his endoscopic evaluation but no Maldonado's esophagus  Orders:    famotidine (PEPCID) 20 mg tablet; Take 1 tablet (20 mg total) by mouth 2 (two) times a day

## 2024-11-01 NOTE — PROGRESS NOTES
"Ambulatory Visit  Name: Pedro Irving      : 1975      MRN: [id number]  Encounter Provider: Mohamud De Los Santos MD  Encounter Date: 2024   Encounter department: [facility]    49-year-old gentleman with a history of mild GERD, history of adenomatous polyp on his last colonoscopy  Assessment & Plan  Chronic reflux esophagitis  -Symptoms well-controlled, negative for Maldonado's on last endoscopy  -Continue twice daily famotidine  Continue with dietary lifestyle modification  -Repeat EGD in  at the same time of his colonoscopy, there is some concern of some pre-Maldonado's changes on his endoscopic evaluation but no Maldonado's esophagus  Orders:    famotidine (PEPCID) 20 mg tablet; Take 1 tablet (20 mg total) by mouth 2 (two) times a day    Morbid obesity (HCC)  -Extensively discussed weight loss options with the patient  Continue to follow-up with PCP         History of Present Illness     Pedro Irving is a 49 y.o. male who presents for follow-up of GERD.  This is a 49-year-old gentleman with a history of acid reflux, last upper endoscopy in  without any evidence of Maldonado's.  Patient reports that as long as he takes Pepcid twice daily has controlled reflux improves.  He is try to modify his diet, not eating late at night.  He has regular bowel movements, denies any diarrhea, melena, constipation, abdominal pain.    Patient is try to modify his diet, he tried to lose some weight      Review of Systems  Review of systems was negative except for pertinent positive mentioned in HPI          Objective     /87 (BP Location: Right arm, Patient Position: Sitting, Cuff Size: Standard)   Pulse 88   Ht 5' 10.5\" (1.791 m)   Wt (!) 194 kg (428 lb)   SpO2 93%   BMI 60.54 kg/m²     Physical Exam  GEN: WN/WD, no acute distress, morbidly obese  HEENT: anicteric, MMM, no cervical lymphadenopathy  CV: RRR, no m/r/g  CHEST: CTA b/l, no WRR  ABD: +BS, soft NT/ND, no " hepatosplenomegaly  EXT: Trace to 1+ lower extremity edema  NEURO: AAOx3  SKIN: no rashes\

## 2024-11-05 ENCOUNTER — OFFICE VISIT (OUTPATIENT)
Dept: PULMONOLOGY | Facility: MEDICAL CENTER | Age: 49
End: 2024-11-05
Payer: COMMERCIAL

## 2024-11-05 VITALS
RESPIRATION RATE: 16 BRPM | OXYGEN SATURATION: 94 % | TEMPERATURE: 97.1 F | DIASTOLIC BLOOD PRESSURE: 76 MMHG | HEART RATE: 75 BPM | HEIGHT: 71 IN | SYSTOLIC BLOOD PRESSURE: 130 MMHG | BODY MASS INDEX: 44.1 KG/M2 | WEIGHT: 315 LBS

## 2024-11-05 DIAGNOSIS — I10 BENIGN ESSENTIAL HYPERTENSION: ICD-10-CM

## 2024-11-05 DIAGNOSIS — E66.01 MORBID OBESITY (HCC): ICD-10-CM

## 2024-11-05 DIAGNOSIS — G47.33 OBSTRUCTIVE SLEEP APNEA: Primary | ICD-10-CM

## 2024-11-05 DIAGNOSIS — J31.0 CHRONIC RHINITIS: ICD-10-CM

## 2024-11-05 PROCEDURE — 99214 OFFICE O/P EST MOD 30 MIN: CPT | Performed by: INTERNAL MEDICINE

## 2024-11-05 NOTE — PATIENT INSTRUCTIONS
I placed order for large AirFit N20 nasal mask to [employer]    Your present CPAP machine was issued 5/4/2021 so next new machine would be in May 2026    Omron 10 series BP cuff

## 2024-11-07 NOTE — ASSESSMENT & PLAN NOTE
Abe does have severe SAGAR and has been compliant with using auto CPAP set at 11 to 20 cm water.  Was not pleased with his nasal pillows mask and I showed him other masks.  He did like the AirFit N20 nasal mask which he liked.  I did place order for this mass to his [facility].  I did review his compliance data for past 1 month and he used it on regular basis for over 8 hours per night.  This resulted in AHI of 7.0 which is good.  Average CPAP pressure was 17.  Continue same setting.    He has been compliant and benefiting from use of CPAP therapy.  He will be due for a new CPAP machine in May 2026

## 2024-11-07 NOTE — PROGRESS NOTES
Assessment & Plan        Problem List Items Addressed This Visit          Cardiovascular and Mediastinum    Benign essential hypertension     Abe is on multiple medication for his hypertension including losartan-hydrochlorothiazide 100-25 mg daily, Inderal 160 mg daily and diltiazem 120 mg daily.  I did recommend he buy Omron 10 series blood pressure cuff.            Respiratory    Chronic rhinitis     Abe does take montelukast, Flonase and Zyrtec which help.         Obstructive sleep apnea - Primary     Abe does have severe SAGAR and has been compliant with using auto CPAP set at 11 to 20 cm water.  Was not pleased with his nasal pillows mask and I showed him other masks.  He did like the AirFit N20 nasal mask which he liked.  I did place order for this mass to his DME [employer].  I did review his compliance data for past 1 month and he used it on regular basis for over 8 hours per night.  This resulted in AHI of 7.0 which is good.  Average CPAP pressure was 17.  Continue same setting.    He has been compliant and benefiting from use of CPAP therapy.  He will be due for a new CPAP machine in May 2026         Relevant Orders    PAP DME Resupply/Reorder       Other    Morbid obesity (HCC)     I did talk to him about trying to watch his diet and lose weight.            Cc: Here for follow-up of SAGAR    General  Pertinent negatives include no chest pain, coughing, fever, headaches, myalgias or sore throat.       Abe presents for follow-up of his severe SAGAR.  He does use auto CPAP which is set at 11 to 20 cm water and DME company is [employer].  He has been using CPAP on a regular basis and does feel quality sleep is good with use of CPAP.  Not have any nocturnal dyspnea.    He does have history of hypertension and is on losartan-hydrochlorothiazide 100-12.5 mg daily, Inderal 160 mg daily, and and diltiazem 120 mg daily.  He does have seasonal allergic rhinitis and does use  fluticasone nasal spray, montelukast and cetirizine for his allergies.    Past Medical History:   Diagnosis Date    Acid reflux     Cellulitis     RESOLVED:5/6/15    CPAP (continuous positive airway pressure) dependence     Hypertension     Loose body in knee     RESOLVED:11/15/16    Neoplasm of bone 01/28/2008    LAST ASSESSED: 1/28/08    Patellar tendonitis     UNSPECIFIED LATERALITY, LAST ASSESSED: 10/19/12    Seasonal allergies     Seborrheic dermatitis 01/20/2010    LAST ASSESSED: 1/20/10    Sleep apnea     wears cpap at night    Sprain and strain     LATE EFFECT; RESOLVED: 5/6/15       Past Surgical History:   Procedure Laterality Date    BREAST BIOPSY Right 01/29/2024    CHOLECYSTECTOMY      FINGER FRACTURE SURGERY Left 1996    pinky    KNEE ARTHROSCOPY Bilateral     left knee growth removed    KNEE SURGERY Right 1992    LIPOMA RESECTION      left side    MN EGD TRANSORAL BIOPSY SINGLE/MULTIPLE N/A 02/22/2016    Procedure: ESOPHAGOGASTRODUODENOSCOPY (EGD);  Surgeon: Neel Ross MD;  Location: [facility] GI [facility];  Service: Gastroenterology    US GUIDED BREAST BIOPSY RIGHT COMPLETE Right 1/29/2024         Current Outpatient Medications:     Ascorbic Acid (VITAMIN C) 1000 MG tablet, Take 1,000 mg by mouth, Disp: , Rfl:     cetirizine (ZyrTEC) 10 mg tablet, Take 1 tablet by mouth daily, Disp: , Rfl:     Cholecalciferol (Vitamin D3) 50 MCG (2000 UT) TABS, Take 2,000 Units by mouth daily, Disp: , Rfl:     Cyanocobalamin (Vitamin B-12) 5000 MCG SUBL, Place under the tongue, Disp: , Rfl:     diltiazem (CARDIZEM CD) 120 mg 24 hr capsule, Take 1 capsule (120 mg total) by mouth daily, Disp: 90 capsule, Rfl: 1    famotidine (PEPCID) 20 mg tablet, Take 1 tablet (20 mg total) by mouth 2 (two) times a day, Disp: 180 tablet, Rfl: 6    fluticasone (FLONASE) 50 mcg/act nasal spray, 1 spray into each nostril if needed, Disp: , Rfl:     losartan-hydrochlorothiazide (HYZAAR) 100-12.5 MG per tablet, Take 1 tablet by mouth daily,  Disp: 90 tablet, Rfl: 1    montelukast (SINGULAIR) 10 mg tablet, Take 1 tablet (10 mg total) by mouth daily, Disp: 90 tablet, Rfl: 1    Omega-3 1400 MG CAPS, Take 1 capsule by mouth daily., Disp: , Rfl:     propranolol (INDERAL LA) 160 mg, Take 1 capsule (160 mg total) by mouth daily, Disp: 90 capsule, Rfl: 1    Turmeric 500 MG CAPS, Take by mouth, Disp: , Rfl:     vitamin E, tocopherol, 400 units capsule, Take 400 Units by mouth daily., Disp: , Rfl:     zinc gluconate 50 mg tablet, Take 50 mg by mouth daily, Disp: , Rfl:     albuterol (ProAir HFA) 90 mcg/act inhaler, Inhale 2 puffs every 6 (six) hours as needed for wheezing (Patient not taking: Reported on 11/5/2024), Disp: 54 g, Rfl: 5    valACYclovir (VALTREX) 1,000 mg tablet, Take 1 tablet (1,000 mg total) by mouth 2 (two) times a day for 2 days (Patient taking differently: Take 1,000 mg by mouth if needed (cold sore)), Disp: 30 tablet, Rfl: 3    Allergies   Allergen Reactions    Shellfish-Derived Products - Food Allergy Hives     During childhood        Social History     Tobacco Use    Smoking status: Never     Passive exposure: Past    Smokeless tobacco: Never   Substance Use Topics    Alcohol use: Never         Family History   Problem Relation Age of Onset    Breast cancer Mother 38    Atrial fibrillation Father     Sleep apnea Father     Cancer Maternal Grandmother     Stomach cancer Maternal Grandmother     Prostate cancer Maternal Grandfather         maybe late 60's    Arthritis Family     Breast cancer Family     Stomach cancer Family     Liver cancer Family     Breast cancer Maternal Great-Grandmother        Review of Systems   Constitutional:  Negative for appetite change and fever.   HENT:  Negative for ear pain, postnasal drip, rhinorrhea, sneezing, sore throat and trouble swallowing.    Eyes:  Negative for redness.   Respiratory:  Negative for cough and wheezing.    Cardiovascular:  Negative for chest pain.   Endocrine: Negative for polydipsia and  "polyphagia.   Musculoskeletal:  Negative for myalgias.   Neurological:  Negative for headaches.   Psychiatric/Behavioral:  Negative for decreased concentration.            Vitals:    11/05/24 1015   BP: 130/76   Pulse: 75   Resp: 16   Temp: (!) 97.1 °F (36.2 °C)   SpO2: 94%     Height: 5' 11\" (180.3 cm)  IBW (Ideal Body Weight): 75.3 kg  Body mass index is 59.41 kg/m².  Weight (last 2 days)       Date/Time Weight    11/05/24 1015 193 (426)                Physical Exam  Vitals reviewed.   Constitutional:       General: He is not in acute distress.     Appearance: Normal appearance. He is well-developed. He is obese.   HENT:      Head: Normocephalic.      Right Ear: External ear normal.      Left Ear: External ear normal.      Nose: Nose normal.      Mouth/Throat:      Mouth: Mucous membranes are moist.      Pharynx: Oropharynx is clear. No oropharyngeal exudate.      Comments: Mallampati score is 3  Eyes:      Conjunctiva/sclera: Conjunctivae normal.      Pupils: Pupils are equal, round, and reactive to light.   Cardiovascular:      Rate and Rhythm: Normal rate and regular rhythm.      Heart sounds: Normal heart sounds.   Pulmonary:      Effort: Pulmonary effort is normal.      Comments: Lung sounds are clear.  No wheezes, crackles, or rhonchi  Abdominal:      General: There is no distension.      Palpations: Abdomen is soft.      Tenderness: There is no abdominal tenderness.   Musculoskeletal:      Cervical back: Neck supple.      Comments: No edema.  No cyanosis or clubbing   Lymphadenopathy:      Cervical: No cervical adenopathy.   Skin:     General: Skin is warm and dry.   Neurological:      General: No focal deficit present.      Mental Status: He is alert and oriented to person, place, and time.   Psychiatric:         Mood and Affect: Mood normal.         Behavior: Behavior normal.         Thought Content: Thought content normal.                  "

## 2024-11-08 NOTE — ASSESSMENT & PLAN NOTE
Abe is on multiple medication for his hypertension including losartan-hydrochlorothiazide 100-25 mg daily, Inderal 160 mg daily and diltiazem 120 mg daily.  I did recommend he buy Omron 10 series blood pressure cuff.

## 2024-12-02 DIAGNOSIS — J31.0 CHRONIC RHINITIS: ICD-10-CM

## 2024-12-04 RX ORDER — MONTELUKAST SODIUM 10 MG/1
10 TABLET ORAL DAILY
Qty: 90 TABLET | Refills: 1 | Status: SHIPPED | OUTPATIENT
Start: 2024-12-04

## 2025-01-20 DIAGNOSIS — I10 BENIGN ESSENTIAL HYPERTENSION: ICD-10-CM

## 2025-01-21 RX ORDER — DILTIAZEM HYDROCHLORIDE 120 MG/1
120 CAPSULE, COATED, EXTENDED RELEASE ORAL DAILY
Qty: 90 CAPSULE | Refills: 0 | OUTPATIENT
Start: 2025-01-21

## 2025-01-21 RX ORDER — DILTIAZEM HYDROCHLORIDE 120 MG/1
120 CAPSULE, COATED, EXTENDED RELEASE ORAL DAILY
Qty: 90 CAPSULE | Refills: 1 | Status: SHIPPED | OUTPATIENT
Start: 2025-01-21

## 2025-01-21 RX ORDER — PROPRANOLOL HYDROCHLORIDE 160 MG/1
160 CAPSULE, EXTENDED RELEASE ORAL DAILY
Qty: 90 CAPSULE | Refills: 1 | Status: SHIPPED | OUTPATIENT
Start: 2025-01-21

## 2025-02-03 ENCOUNTER — TELEPHONE (OUTPATIENT)
Dept: FAMILY MEDICINE CLINIC | Facility: CLINIC | Age: 50
End: 2025-02-03

## 2025-02-03 DIAGNOSIS — I10 BENIGN ESSENTIAL HYPERTENSION: ICD-10-CM

## 2025-02-06 RX ORDER — LOSARTAN POTASSIUM AND HYDROCHLOROTHIAZIDE 12.5; 1 MG/1; MG/1
1 TABLET ORAL DAILY
Qty: 30 TABLET | Refills: 0 | Status: SHIPPED | OUTPATIENT
Start: 2025-02-06

## 2025-02-20 ENCOUNTER — RA CDI HCC (OUTPATIENT)
Dept: OTHER | Facility: HOSPITAL | Age: 50
End: 2025-02-20

## 2025-02-20 NOTE — PROGRESS NOTES
HCC coding opportunities       Chart reviewed, no opportunity found: CHART REVIEWED, NO OPPORTUNITY FOUND        Patients Insurance        Commercial Insurance: [employer] Commercial Insurance     E66.01

## 2025-02-24 ENCOUNTER — APPOINTMENT (OUTPATIENT)
Dept: LAB | Facility: CLINIC | Age: 50
End: 2025-02-24
Payer: COMMERCIAL

## 2025-02-24 DIAGNOSIS — Z13.220 SCREENING FOR LIPID DISORDERS: Primary | ICD-10-CM

## 2025-02-24 DIAGNOSIS — Z13.1 SCREENING FOR DIABETES MELLITUS: ICD-10-CM

## 2025-02-24 DIAGNOSIS — Z12.5 PROSTATE CANCER SCREENING: ICD-10-CM

## 2025-02-24 DIAGNOSIS — R73.03 PREDIABETES: ICD-10-CM

## 2025-02-24 LAB
ALBUMIN SERPL BCG-MCNC: 4.2 G/DL (ref 3.5–5)
ALP SERPL-CCNC: 80 U/L (ref 34–104)
ALT SERPL W P-5'-P-CCNC: 23 U/L (ref 7–52)
ANION GAP SERPL CALCULATED.3IONS-SCNC: 10 MMOL/L (ref 4–13)
AST SERPL W P-5'-P-CCNC: 20 U/L (ref 13–39)
BILIRUB SERPL-MCNC: 0.77 MG/DL (ref 0.2–1)
BUN SERPL-MCNC: 11 MG/DL (ref 5–25)
CALCIUM SERPL-MCNC: 9.5 MG/DL (ref 8.4–10.2)
CHLORIDE SERPL-SCNC: 96 MMOL/L (ref 96–108)
CHOLEST SERPL-MCNC: 226 MG/DL (ref ?–200)
CO2 SERPL-SCNC: 30 MMOL/L (ref 21–32)
CREAT SERPL-MCNC: 0.77 MG/DL (ref 0.6–1.3)
EST. AVERAGE GLUCOSE BLD GHB EST-MCNC: 171 MG/DL
GFR SERPL CREATININE-BSD FRML MDRD: 105 ML/MIN/1.73SQ M
GLUCOSE P FAST SERPL-MCNC: 165 MG/DL (ref 65–99)
HBA1C MFR BLD: 7.6 %
HDLC SERPL-MCNC: 42 MG/DL
LDLC SERPL CALC-MCNC: 152 MG/DL (ref 0–100)
POTASSIUM SERPL-SCNC: 3.6 MMOL/L (ref 3.5–5.3)
PROT SERPL-MCNC: 8.3 G/DL (ref 6.4–8.4)
PSA SERPL-MCNC: 1.75 NG/ML (ref 0–4)
SODIUM SERPL-SCNC: 136 MMOL/L (ref 135–147)
TRIGL SERPL-MCNC: 159 MG/DL (ref ?–150)

## 2025-02-24 PROCEDURE — 80061 LIPID PANEL: CPT

## 2025-02-24 PROCEDURE — 36415 COLL VENOUS BLD VENIPUNCTURE: CPT

## 2025-02-24 PROCEDURE — G0103 PSA SCREENING: HCPCS

## 2025-02-24 PROCEDURE — 83036 HEMOGLOBIN GLYCOSYLATED A1C: CPT

## 2025-02-24 PROCEDURE — 80053 COMPREHEN METABOLIC PANEL: CPT

## 2025-02-27 ENCOUNTER — OFFICE VISIT (OUTPATIENT)
Dept: FAMILY MEDICINE CLINIC | Facility: CLINIC | Age: 50
End: 2025-02-27
Payer: COMMERCIAL

## 2025-02-27 VITALS
RESPIRATION RATE: 18 BRPM | HEART RATE: 84 BPM | HEIGHT: 71 IN | DIASTOLIC BLOOD PRESSURE: 72 MMHG | TEMPERATURE: 98.9 F | BODY MASS INDEX: 44.1 KG/M2 | WEIGHT: 315 LBS | SYSTOLIC BLOOD PRESSURE: 126 MMHG

## 2025-02-27 DIAGNOSIS — K21.00 CHRONIC REFLUX ESOPHAGITIS: ICD-10-CM

## 2025-02-27 DIAGNOSIS — Z00.00 HEALTHCARE MAINTENANCE: ICD-10-CM

## 2025-02-27 DIAGNOSIS — E11.22 TYPE 2 DIABETES MELLITUS WITH STAGE 1 CHRONIC KIDNEY DISEASE, WITHOUT LONG-TERM CURRENT USE OF INSULIN  (HCC): Primary | ICD-10-CM

## 2025-02-27 DIAGNOSIS — Z91.89 FRAMINGHAM CARDIAC RISK 10-20% IN NEXT 10 YEARS: ICD-10-CM

## 2025-02-27 DIAGNOSIS — N18.1 CKD STAGE 1 DUE TO TYPE 2 DIABETES MELLITUS  (HCC): ICD-10-CM

## 2025-02-27 DIAGNOSIS — E66.01 MORBID OBESITY (HCC): ICD-10-CM

## 2025-02-27 DIAGNOSIS — G47.33 OBSTRUCTIVE SLEEP APNEA: ICD-10-CM

## 2025-02-27 DIAGNOSIS — E11.22 CKD STAGE 1 DUE TO TYPE 2 DIABETES MELLITUS  (HCC): ICD-10-CM

## 2025-02-27 DIAGNOSIS — N18.1 TYPE 2 DIABETES MELLITUS WITH STAGE 1 CHRONIC KIDNEY DISEASE, WITHOUT LONG-TERM CURRENT USE OF INSULIN  (HCC): Primary | ICD-10-CM

## 2025-02-27 DIAGNOSIS — I10 BENIGN ESSENTIAL HYPERTENSION: ICD-10-CM

## 2025-02-27 PROBLEM — R73.09 ELEVATED GLUCOSE: Status: RESOLVED | Noted: 2020-10-26 | Resolved: 2025-02-27

## 2025-02-27 PROCEDURE — 99396 PREV VISIT EST AGE 40-64: CPT | Performed by: FAMILY MEDICINE

## 2025-02-27 PROCEDURE — 99215 OFFICE O/P EST HI 40 MIN: CPT | Performed by: FAMILY MEDICINE

## 2025-02-27 RX ORDER — TIRZEPATIDE 2.5 MG/.5ML
2.5 INJECTION, SOLUTION SUBCUTANEOUS WEEKLY
Qty: 2 ML | Refills: 0 | Status: SHIPPED | OUTPATIENT
Start: 2025-02-27

## 2025-02-27 NOTE — PROGRESS NOTES
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  (HCC)  New, explained to pt  Stay hydrated  Lab Results   Component Value Date    HGBA1C 7.6 (H) 2025            Type 2 diabetes mellitus with stage 1 chronic kidney disease, without long-term current use of insulin  (HCC)  New, uncontrolled  Tx options advised  Start mounjaro but other GLP1ra options discussed  Consider titration in 1 month  Risks of mtc/pancreatitis/gb advised  [facility] suggested  Lab Results   Component Value Date    HGBA1C 7.6 (H) 2025       Orders:    Tirzepatide (Mounjaro) 2.5 MG/0.5ML SOAJ; Inject 2.5 mg under the skin once a week    Comprehensive metabolic panel; Future    Hemoglobin A1C; Future    Albumin / creatinine urine ratio; Future    Benign essential hypertension  stable       Chronic reflux esophagitis  stable       Morbid obesity (HCC)  Weight loss recommended           Obstructive sleep apnea  Stable, continue cpap       [place] cardiac risk 10-20% in next 10 years  Advised will start statin for risk reduction in near future       Healthcare maintenance  Updated today  Labs reviewed  ALONZO wnl          History of Present Illness   HPI  Here for CPE but also new diagnosis of uncontrolled type 2 diabetes to initiate treament    Has been trying diet and exercise for Diabetes before the labs  FBS has risen to 165, 120 previously  A1c has risen to 7.6 from 6.9  LDL  152 from 126    Review of Systems   Constitutional:  Negative for chills, fever and unexpected weight change.     Family History   Problem Relation Age of Onset    Breast cancer Mother 38    Atrial fibrillation Father     Sleep apnea Father     Cancer Maternal Grandmother     Stomach cancer Maternal Grandmother     Prostate cancer Maternal  "Grandfather         maybe late 60's    Arthritis Family     Breast cancer Family     Stomach cancer Family     Liver cancer Family     Breast cancer Maternal Great-Grandmother       Social History     Tobacco Use    Smoking status: Never     Passive exposure: Past    Smokeless tobacco: Never   Vaping Use    Vaping status: Never Used   Substance Use Topics    Alcohol use: Never    Drug use: No      Current Outpatient Medications   Medication Instructions    cetirizine (ZyrTEC) 10 mg tablet 1 tablet, Daily    Cyanocobalamin (Vitamin B-12) 5000 MCG SUBL Place under the tongue    diltiazem (CARDIZEM CD) 120 mg, Oral, Daily    famotidine (PEPCID) 20 mg, Oral, 2 times daily    fluticasone (FLONASE) 50 mcg/act nasal spray 1 spray, As needed    losartan-hydrochlorothiazide (HYZAAR) 100-12.5 MG per tablet 1 tablet, Oral, Daily    montelukast (SINGULAIR) 10 mg, Oral, Daily    Mounjaro 2.5 mg, Subcutaneous, Weekly    Omega-3 1400 MG CAPS 1 capsule, Daily    propranolol (INDERAL LA) 160 mg, Oral, Daily    Turmeric 500 MG CAPS Take by mouth    valACYclovir (VALTREX) 1,000 mg, Oral, 2 times daily    vitamin C 1,000 mg    Vitamin D3 2,000 Units, Daily    vitamin E (tocopherol) 400 Units, Daily    zinc gluconate 50 mg, Daily     >>>>>>>>  Return in about 3 months (around 5/27/2025) for Recheck.  >>>>>>>>    [POCT]  No results found for this or any previous visit (from the past 24 hours).    Visit Vitals  /72   Pulse 84   Temp 98.9 °F (37.2 °C)   Resp 18   Ht 5' 11\" (1.803 m)   Wt (!) 195 kg (428 lb 12.8 oz)   BMI 59.81 kg/m²   Smoking Status Never   BSA 2.92 m²        Objective   /72   Pulse 84   Temp 98.9 °F (37.2 °C)   Resp 18   Ht 5' 11\" (1.803 m)   Wt (!) 195 kg (428 lb 12.8 oz)   BMI 59.81 kg/m²      Physical Exam  Vitals and nursing note reviewed.   Constitutional:       General: He is not in acute distress.     Appearance: He is well-developed. He is obese. He is not ill-appearing.   HENT:      Head: " Normocephalic.      Right Ear: Tympanic membrane and ear canal normal.      Left Ear: Tympanic membrane and ear canal normal.      Nose: No congestion.      Mouth/Throat:      Mouth: Mucous membranes are moist.   Eyes:      General: No scleral icterus.     Conjunctiva/sclera: Conjunctivae normal.   Neck:      Vascular: No carotid bruit.   Cardiovascular:      Rate and Rhythm: Normal rate and regular rhythm.      Pulses: no weak pulses.           Dorsalis pedis pulses are 2+ on the right side and 2+ on the left side.      Heart sounds: No murmur heard.  Pulmonary:      Effort: Pulmonary effort is normal. No respiratory distress.      Breath sounds: No wheezing.   Abdominal:      Palpations: Abdomen is soft.      Tenderness: There is no abdominal tenderness.      Hernia: No hernia is present.   Genitourinary:     Penis: Normal.       Testes: Normal.      Prostate: Normal.      Rectum: Normal.   Musculoskeletal:         General: No deformity.      Cervical back: Neck supple.      Right lower leg: No edema.      Left lower leg: No edema.   Skin:     General: Skin is warm and dry.      Coloration: Skin is not jaundiced or pale.   Neurological:      Mental Status: He is alert.      Motor: No weakness.      Gait: Gait normal.   Psychiatric:         Mood and Affect: Mood normal.         Behavior: Behavior normal.         Thought Content: Thought content normal.     Diabetic Foot Exam    Patient's shoes and socks removed.    Right Foot/Ankle   Right Foot Inspection  Skin Exam: skin intact. No abnormal color.     Toe Exam:  no right toe deformity    Sensory   Monofilament testing: intact    Vascular  The right DP pulse is 2+.     Left Foot/Ankle  Left Foot Inspection  Skin Exam: skin intact. Normal color.     Toe Exam: No left toe deformity.     Sensory   Monofilament testing: intact    Vascular  The left DP pulse is 2+.     Assign Risk Category  No deformity present  No loss of protective sensation  No weak pulses  Risk:  0    Administrative Statements   I have spent a total time of 41 minutes in caring for this patient on the day of the visit/encounter including Diagnostic results, Prognosis, Risks and benefits of tx options, Instructions for management, Patient and family education, Importance of tx compliance, Risk factor reductions, Impressions, Documenting in the medical record, and Obtaining or reviewing history  .

## 2025-02-28 ENCOUNTER — TELEPHONE (OUTPATIENT)
Age: 50
End: 2025-02-28

## 2025-02-28 NOTE — ASSESSMENT & PLAN NOTE
New, explained to pt  Stay hydrated  Lab Results   Component Value Date    HGBA1C 7.6 (H) 02/24/2025

## 2025-02-28 NOTE — ASSESSMENT & PLAN NOTE
New, uncontrolled  Tx options advised  Start mounjaro but other GLP1ra options discussed  Consider titration in 1 month  Risks of mtc/pancreatitis/gb advised  Diabetic diet ed at [facility] by RD suggested  Lab Results   Component Value Date    HGBA1C 7.6 (H) 02/24/2025       Orders:    Tirzepatide (Mounjaro) 2.5 MG/0.5ML SOAJ; Inject 2.5 mg under the skin once a week    Comprehensive metabolic panel; Future    Hemoglobin A1C; Future    Albumin / creatinine urine ratio; Future

## 2025-02-28 NOTE — TELEPHONE ENCOUNTER
ADRIANE Sanford 2.5 MG/0.5ML SUBMITTED     to [facility] [place]     via    [x]CMM-KEY: [id number]   []Surescripts-Case ID #    []Availity-Auth ID #  NDC #    []Faxed to plan   []Other website    []Phone call Case ID #      []PA sent as URGENT    All office notes, labs and other pertaining documents and studies sent. Clinical questions answered. Awaiting determination from insurance company.     Turnaround time for your insurance to make a decision on your Prior Authorization can take 7-21 business days.

## 2025-03-03 ENCOUNTER — TELEPHONE (OUTPATIENT)
Age: 50
End: 2025-03-03

## 2025-03-03 ENCOUNTER — OFFICE VISIT (OUTPATIENT)
Dept: FAMILY MEDICINE CLINIC | Facility: CLINIC | Age: 50
End: 2025-03-03
Payer: COMMERCIAL

## 2025-03-03 VITALS
HEIGHT: 71 IN | WEIGHT: 315 LBS | TEMPERATURE: 97.7 F | SYSTOLIC BLOOD PRESSURE: 128 MMHG | RESPIRATION RATE: 17 BRPM | HEART RATE: 84 BPM | DIASTOLIC BLOOD PRESSURE: 88 MMHG | BODY MASS INDEX: 44.1 KG/M2

## 2025-03-03 DIAGNOSIS — H66.90 ACUTE OTITIS MEDIA, UNSPECIFIED OTITIS MEDIA TYPE: Primary | ICD-10-CM

## 2025-03-03 LAB
SARS-COV-2 AG UPPER RESP QL IA: NEGATIVE
SL AMB POCT RAPID FLU A: NEGATIVE
SL AMB POCT RAPID FLU B: NEGATIVE
VALID CONTROL: NORMAL

## 2025-03-03 PROCEDURE — 99213 OFFICE O/P EST LOW 20 MIN: CPT | Performed by: FAMILY MEDICINE

## 2025-03-03 PROCEDURE — 87811 SARS-COV-2 COVID19 W/OPTIC: CPT | Performed by: FAMILY MEDICINE

## 2025-03-03 PROCEDURE — 87804 INFLUENZA ASSAY W/OPTIC: CPT | Performed by: FAMILY MEDICINE

## 2025-03-03 RX ORDER — AMOXICILLIN 875 MG/1
875 TABLET, COATED ORAL 2 TIMES DAILY
Qty: 14 TABLET | Refills: 0 | Status: SHIPPED | OUTPATIENT
Start: 2025-03-03 | End: 2025-03-10

## 2025-03-03 NOTE — TELEPHONE ENCOUNTER
Pt requested call back from PCP with concerns of being on propranol and starting mounjaro.  Please advise

## 2025-03-03 NOTE — PROGRESS NOTES
"Name: Pedro Irving      : 1975      MRN: [id number]  Encounter Provider: Edith Monterroso DO  Encounter Date: 3/3/2025   Encounter department: [facility]  :  Assessment & Plan  Acute otitis media, unspecified otitis media type    Orders:  •  Poct Covid 19 Rapid Antigen Test  •  POCT rapid flu A and B  •  amoxicillin (AMOXIL) 875 mg tablet; Take 1 tablet (875 mg total) by mouth 2 (two) times a day for 7 days      Return if symptoms worsen or fail to improve.     History of Present Illness   He is leaving for [place] tomorrow  He was really feeling ill 2 days ago.  He is hot and cold.       Review of Systems   Constitutional:  Positive for chills.   HENT:  Positive for rhinorrhea and sinus pressure.    Respiratory:  Positive for cough.    Gastrointestinal:  Positive for diarrhea (only for one day). Negative for vomiting.       Objective   /88   Pulse 84   Temp 97.7 °F (36.5 °C)   Resp 17   Ht 5' 11\" (1.803 m)   Wt (!) 197 kg (434 lb)   BMI 60.53 kg/m²      Physical Exam  Vitals and nursing note reviewed.   HENT:      Head: Normocephalic and atraumatic.      Comments: Bilateral maxillary sinus tenderness      Right Ear: External ear normal. A middle ear effusion is present.      Left Ear: A middle ear effusion is present. Tympanic membrane is erythematous.   Cardiovascular:      Rate and Rhythm: Normal rate and regular rhythm.      Heart sounds: Normal heart sounds. No murmur heard.  Pulmonary:      Effort: Pulmonary effort is normal. No respiratory distress.      Breath sounds: Normal breath sounds. No wheezing.         "

## 2025-03-03 NOTE — TELEPHONE ENCOUNTER
PA Mounjaro 2.5 MG/0.5ML APPROVED     Date(s) approved 1/29/25 - 2/28/26    Case #     Patient advised by          []MyChart Message  []Phone call   [x]LMOM  []L/M to call office as no active Communication consent on file  []Unable to leave detailed message as VM not approved on Communication consent       Pharmacy advised by    [x]Fax  []Phone call    Specialty Pharmacy    []

## 2025-03-13 ENCOUNTER — HOSPITAL ENCOUNTER (OUTPATIENT)
Dept: RADIOLOGY | Facility: HOSPITAL | Age: 50
Discharge: HOME | End: 2025-03-13
Attending: FAMILY MEDICINE
Payer: COMMERCIAL

## 2025-03-13 VITALS — BODY MASS INDEX: 44.1 KG/M2 | HEIGHT: 71 IN | WEIGHT: 315 LBS

## 2025-03-13 DIAGNOSIS — R92.8 ABNORMAL MAMMOGRAM OF RIGHT BREAST: ICD-10-CM

## 2025-03-13 PROCEDURE — G0279 TOMOSYNTHESIS, MAMMO: HCPCS

## 2025-03-13 PROCEDURE — 77065 DX MAMMO INCL CAD UNI: CPT

## 2025-03-14 ENCOUNTER — RESULTS FOLLOW-UP (OUTPATIENT)
Dept: FAMILY MEDICINE CLINIC | Facility: CLINIC | Age: 50
End: 2025-03-14

## 2025-04-07 DIAGNOSIS — N18.1 TYPE 2 DIABETES MELLITUS WITH STAGE 1 CHRONIC KIDNEY DISEASE, WITHOUT LONG-TERM CURRENT USE OF INSULIN  (HCC): ICD-10-CM

## 2025-04-07 DIAGNOSIS — E11.22 TYPE 2 DIABETES MELLITUS WITH STAGE 1 CHRONIC KIDNEY DISEASE, WITHOUT LONG-TERM CURRENT USE OF INSULIN  (HCC): ICD-10-CM

## 2025-04-08 ENCOUNTER — TELEPHONE (OUTPATIENT)
Dept: FAMILY MEDICINE CLINIC | Facility: CLINIC | Age: 50
End: 2025-04-08

## 2025-04-08 NOTE — TELEPHONE ENCOUNTER
Patient called the RX Refill Line. Message is being forwarded to the office.     Patient is requesting a refill for the mounjaro. Patient states he does not know if next refill is suppose to be an increased dosage. Patient currently taking 2.5 mg. Has no injections left and is due to inject again on Sat.     States had some nausea and stomach cramping the first week but that has improved.     [facility]     Please contact patient at

## 2025-04-10 RX ORDER — TIRZEPATIDE 5 MG/.5ML
5 INJECTION, SOLUTION SUBCUTANEOUS WEEKLY
Qty: 2 ML | Refills: 0 | Status: SHIPPED | OUTPATIENT
Start: 2025-04-10

## 2025-04-11 ENCOUNTER — TELEPHONE (OUTPATIENT)
Age: 50
End: 2025-04-11

## 2025-04-11 NOTE — TELEPHONE ENCOUNTER
Patient called in post going to pharmacy to report that his co-pay for refill for Mounjaro went from $25 to $365.00.  discount program he applied for does not apply. Patient reports he cannot afford the increase monthly cost. Next injection due Sat 4/12. What to do? Please follow up with patient for provider response.

## 2025-04-21 DIAGNOSIS — I10 BENIGN ESSENTIAL HYPERTENSION: ICD-10-CM

## 2025-04-22 RX ORDER — DILTIAZEM HYDROCHLORIDE 120 MG/1
120 CAPSULE, COATED, EXTENDED RELEASE ORAL DAILY
Qty: 90 CAPSULE | Refills: 1 | Status: SHIPPED | OUTPATIENT
Start: 2025-04-22

## 2025-05-03 DIAGNOSIS — B00.9 HERPES: ICD-10-CM

## 2025-05-03 DIAGNOSIS — N18.1 TYPE 2 DIABETES MELLITUS WITH STAGE 1 CHRONIC KIDNEY DISEASE, WITHOUT LONG-TERM CURRENT USE OF INSULIN  (HCC): ICD-10-CM

## 2025-05-03 DIAGNOSIS — E11.22 TYPE 2 DIABETES MELLITUS WITH STAGE 1 CHRONIC KIDNEY DISEASE, WITHOUT LONG-TERM CURRENT USE OF INSULIN  (HCC): ICD-10-CM

## 2025-05-04 RX ORDER — VALACYCLOVIR HYDROCHLORIDE 1 G/1
1000 TABLET, FILM COATED ORAL 2 TIMES DAILY
Qty: 30 TABLET | Refills: 0 | Status: SHIPPED | OUTPATIENT
Start: 2025-05-04 | End: 2025-05-06

## 2025-05-06 RX ORDER — TIRZEPATIDE 7.5 MG/.5ML
7.5 INJECTION, SOLUTION SUBCUTANEOUS WEEKLY
Qty: 2 ML | Refills: 0 | Status: SHIPPED | OUTPATIENT
Start: 2025-05-06

## 2025-06-02 DIAGNOSIS — I10 BENIGN ESSENTIAL HYPERTENSION: ICD-10-CM

## 2025-06-02 DIAGNOSIS — J31.0 CHRONIC RHINITIS: ICD-10-CM

## 2025-06-03 DIAGNOSIS — E11.22 TYPE 2 DIABETES MELLITUS WITH STAGE 1 CHRONIC KIDNEY DISEASE, WITHOUT LONG-TERM CURRENT USE OF INSULIN  (HCC): ICD-10-CM

## 2025-06-03 DIAGNOSIS — N18.1 TYPE 2 DIABETES MELLITUS WITH STAGE 1 CHRONIC KIDNEY DISEASE, WITHOUT LONG-TERM CURRENT USE OF INSULIN  (HCC): ICD-10-CM

## 2025-06-03 RX ORDER — MONTELUKAST SODIUM 10 MG/1
10 TABLET ORAL DAILY
Qty: 90 TABLET | Refills: 1 | Status: SHIPPED | OUTPATIENT
Start: 2025-06-03

## 2025-06-03 RX ORDER — TIRZEPATIDE 10 MG/.5ML
10 INJECTION, SOLUTION SUBCUTANEOUS WEEKLY
Qty: 2 ML | Refills: 0 | Status: SHIPPED | OUTPATIENT
Start: 2025-06-03

## 2025-06-03 RX ORDER — LOSARTAN POTASSIUM AND HYDROCHLOROTHIAZIDE 12.5; 1 MG/1; MG/1
1 TABLET ORAL DAILY
Qty: 30 TABLET | Refills: 1 | Status: SHIPPED | OUTPATIENT
Start: 2025-06-03

## 2025-06-11 ENCOUNTER — RA CDI HCC (OUTPATIENT)
Dept: OTHER | Facility: HOSPITAL | Age: 50
End: 2025-06-11

## 2025-06-11 NOTE — PROGRESS NOTES
HCC coding opportunities          Chart Reviewed number of suggestions sent to Provider: 1  E11.65     Patients Insurance        Commercial Insurance: [employer] ([employer] Insurance

## 2025-06-13 ENCOUNTER — APPOINTMENT (OUTPATIENT)
Dept: LAB | Facility: CLINIC | Age: 50
End: 2025-06-13
Attending: FAMILY MEDICINE
Payer: COMMERCIAL

## 2025-06-13 DIAGNOSIS — E11.22 TYPE 2 DIABETES MELLITUS WITH STAGE 1 CHRONIC KIDNEY DISEASE, WITHOUT LONG-TERM CURRENT USE OF INSULIN  (HCC): ICD-10-CM

## 2025-06-13 DIAGNOSIS — N18.1 TYPE 2 DIABETES MELLITUS WITH STAGE 1 CHRONIC KIDNEY DISEASE, WITHOUT LONG-TERM CURRENT USE OF INSULIN  (HCC): ICD-10-CM

## 2025-06-13 LAB
ALBUMIN SERPL BCG-MCNC: 4 G/DL (ref 3.5–5)
ALP SERPL-CCNC: 60 U/L (ref 34–104)
ALT SERPL W P-5'-P-CCNC: 16 U/L (ref 7–52)
ANION GAP SERPL CALCULATED.3IONS-SCNC: 10 MMOL/L (ref 4–13)
AST SERPL W P-5'-P-CCNC: 15 U/L (ref 13–39)
BILIRUB SERPL-MCNC: 0.68 MG/DL (ref 0.2–1)
BUN SERPL-MCNC: 13 MG/DL (ref 5–25)
CALCIUM SERPL-MCNC: 9.2 MG/DL (ref 8.4–10.2)
CHLORIDE SERPL-SCNC: 98 MMOL/L (ref 96–108)
CO2 SERPL-SCNC: 30 MMOL/L (ref 21–32)
CREAT SERPL-MCNC: 0.8 MG/DL (ref 0.6–1.3)
CREAT UR-MCNC: 385.1 MG/DL
EST. AVERAGE GLUCOSE BLD GHB EST-MCNC: 126 MG/DL
GFR SERPL CREATININE-BSD FRML MDRD: 103 ML/MIN/1.73SQ M
GLUCOSE P FAST SERPL-MCNC: 100 MG/DL (ref 65–99)
HBA1C MFR BLD: 6 %
MICROALBUMIN UR-MCNC: 49 MG/L
MICROALBUMIN/CREAT 24H UR: 13 MG/G CREATININE (ref 0–30)
POTASSIUM SERPL-SCNC: 3.6 MMOL/L (ref 3.5–5.3)
PROT SERPL-MCNC: 7.6 G/DL (ref 6.4–8.4)
SODIUM SERPL-SCNC: 138 MMOL/L (ref 135–147)

## 2025-06-13 PROCEDURE — 82043 UR ALBUMIN QUANTITATIVE: CPT

## 2025-06-13 PROCEDURE — 80053 COMPREHEN METABOLIC PANEL: CPT

## 2025-06-13 PROCEDURE — 82570 ASSAY OF URINE CREATININE: CPT

## 2025-06-13 PROCEDURE — 36415 COLL VENOUS BLD VENIPUNCTURE: CPT

## 2025-06-13 PROCEDURE — 83036 HEMOGLOBIN GLYCOSYLATED A1C: CPT

## 2025-06-14 ENCOUNTER — RESULTS FOLLOW-UP (OUTPATIENT)
Dept: FAMILY MEDICINE CLINIC | Facility: CLINIC | Age: 50
End: 2025-06-14

## 2025-06-18 ENCOUNTER — OFFICE VISIT (OUTPATIENT)
Dept: FAMILY MEDICINE CLINIC | Facility: CLINIC | Age: 50
End: 2025-06-18
Payer: COMMERCIAL

## 2025-06-18 VITALS
HEIGHT: 71 IN | SYSTOLIC BLOOD PRESSURE: 120 MMHG | RESPIRATION RATE: 18 BRPM | TEMPERATURE: 97 F | DIASTOLIC BLOOD PRESSURE: 80 MMHG | BODY MASS INDEX: 44.1 KG/M2 | WEIGHT: 315 LBS | HEART RATE: 80 BPM

## 2025-06-18 DIAGNOSIS — I10 BENIGN ESSENTIAL HYPERTENSION: ICD-10-CM

## 2025-06-18 DIAGNOSIS — E11.22 CKD STAGE 1 DUE TO TYPE 2 DIABETES MELLITUS  (HCC): ICD-10-CM

## 2025-06-18 DIAGNOSIS — N18.1 TYPE 2 DIABETES MELLITUS WITH STAGE 1 CHRONIC KIDNEY DISEASE, WITHOUT LONG-TERM CURRENT USE OF INSULIN  (HCC): Primary | ICD-10-CM

## 2025-06-18 DIAGNOSIS — E11.22 TYPE 2 DIABETES MELLITUS WITH STAGE 1 CHRONIC KIDNEY DISEASE, WITHOUT LONG-TERM CURRENT USE OF INSULIN  (HCC): Primary | ICD-10-CM

## 2025-06-18 DIAGNOSIS — G47.33 OBSTRUCTIVE SLEEP APNEA: ICD-10-CM

## 2025-06-18 DIAGNOSIS — N18.1 CKD STAGE 1 DUE TO TYPE 2 DIABETES MELLITUS  (HCC): ICD-10-CM

## 2025-06-18 PROCEDURE — 99214 OFFICE O/P EST MOD 30 MIN: CPT | Performed by: FAMILY MEDICINE

## 2025-06-18 RX ORDER — TIRZEPATIDE 12.5 MG/.5ML
12.5 INJECTION, SOLUTION SUBCUTANEOUS WEEKLY
Qty: 2 ML | Refills: 0 | Status: SHIPPED | OUTPATIENT
Start: 2025-06-18

## 2025-06-18 NOTE — PROGRESS NOTES
Name: Pedro Irving      : 1975      MRN: [id number]  Encounter Provider: Jorge Hampton DO  Encounter Date: 2025   Encounter department: [facility]  :  Assessment & Plan  Type 2 diabetes mellitus with stage 1 chronic kidney disease, without long-term current use of insulin  (HCC)  Improving  Pt to see if plan covers another GLP1ra better and will switch if needed  Continue diet/exercise efforts and smbg for titration  Lab Results   Component Value Date    HGBA1C 6.0 (H) 2025       Orders:    Comprehensive metabolic panel; Future    Hemoglobin A1C; Future    Tirzepatide (Mounjaro) 12.5 MG/0.5ML SOAJ; Inject 12.5 mg under the skin once a week    Obstructive sleep apnea  Stable  Advised indication for zepbound  Orders:    Tirzepatide (Mounjaro) 12.5 MG/0.5ML SOAJ; Inject 12.5 mg under the skin once a week    Benign essential hypertension  Stable on meds       BMI 50.0-59.9, adult (HCC)  aware       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      Lab Results   Component Value Date    HGBA1C 6.0 (H) 2025    HGBA1C 7.6 (H) 2025    HGBA1C 6.9 (H) 2024     Lab Results   Component Value Date    GLUF 100 (H) 2025    LDLCALC 152 (H) 2025    CREATININE 0.80 2025         Review of Systems   Gastrointestinal:  Positive for nausea. Negative for abdominal pain, constipation and diarrhea.     Family History[1] was reviewed  Social History[2] was reviewed    Current Outpatient Medications   Medication Instructions     "cetirizine (ZyrTEC) 10 mg tablet 1 tablet, Daily    Cyanocobalamin (Vitamin B-12) 5000 MCG SUBL Place under the tongue    diltiazem (CARDIZEM CD) 120 mg, Oral, Daily    famotidine (PEPCID) 20 mg, Oral, 2 times daily    fluticasone (FLONASE) 50 mcg/act nasal spray 1 spray, As needed    losartan-hydrochlorothiazide (HYZAAR) 100-12.5 MG per tablet 1 tablet, Oral, Daily    montelukast (SINGULAIR) 10 mg, Oral, Daily    Mounjaro 12.5 mg, Subcutaneous, Weekly    Omega-3 1400 MG CAPS 1 capsule, Daily    propranolol (INDERAL LA) 160 mg, Oral, Daily    Turmeric 500 MG CAPS Take by mouth    valACYclovir (VALTREX) 1,000 mg, Oral, 2 times daily    vitamin C 1,000 mg    Vitamin D3 2,000 Units, Daily    vitamin E (tocopherol) 400 Units, Daily    zinc gluconate 50 mg, Daily     >>>>>>>>  Return in about 13 weeks (around 9/17/2025).  >>>>>>>>    [POCT]  No results found for this or any previous visit (from the past 24 hours).    Visit Vitals  /80   Pulse 80   Temp (!) 97 °F (36.1 °C)   Resp 18   Ht 5' 11\" (1.803 m)   Wt (!) 188 kg (414 lb)   BMI 57.74 kg/m²   Smoking Status Never   BSA 2.87 m²        Objective   /80   Pulse 80   Temp (!) 97 °F (36.1 °C)   Resp 18   Ht 5' 11\" (1.803 m)   Wt (!) 188 kg (414 lb)   BMI 57.74 kg/m²      Physical Exam  Vitals and nursing note reviewed.   Constitutional:       General: He is not in acute distress.     Appearance: He is well-developed. He is obese. He is not ill-appearing.   HENT:      Head: Normocephalic.      Right Ear: Tympanic membrane normal.      Left Ear: Tympanic membrane normal.      Mouth/Throat:      Mouth: Mucous membranes are moist.     Eyes:      General: No scleral icterus.     Conjunctiva/sclera: Conjunctivae normal.       Cardiovascular:      Rate and Rhythm: Normal rate and regular rhythm.      Heart sounds: No murmur heard.  Pulmonary:      Effort: Pulmonary effort is normal. No respiratory distress.      Breath sounds: No wheezing.   Abdominal:      " Palpations: Abdomen is soft.      Tenderness: There is no abdominal tenderness.     Musculoskeletal:         General: No deformity.      Cervical back: Neck supple.     Skin:     General: Skin is warm and dry.      Coloration: Skin is not pale.     Neurological:      Mental Status: He is alert.      Gait: Gait normal.     Psychiatric:         Mood and Affect: Mood normal.         Behavior: Behavior normal.         Thought Content: Thought content normal.              [1]   Family History  Problem Relation Name Age of Onset    Breast cancer Mother  38    Atrial fibrillation Father      Sleep apnea Father      Cancer Maternal Grandmother      Stomach cancer Maternal Grandmother      Prostate cancer Maternal Grandfather          maybe late 60's    Arthritis Family      Breast cancer Family      Stomach cancer Family      Liver cancer Family      Breast cancer Maternal Great-Grandmother     [2]   Social History  Tobacco Use    Smoking status: Never     Passive exposure: Past    Smokeless tobacco: Never   Vaping Use    Vaping status: Never Used   Substance Use Topics    Alcohol use: Never    Drug use: No

## 2025-06-18 NOTE — ASSESSMENT & PLAN NOTE
Improving  Pt to see if plan covers another GLP1ra better and will switch if needed  Continue diet/exercise efforts and smbg for titration  Lab Results   Component Value Date    HGBA1C 6.0 (H) 06/13/2025       Orders:    Comprehensive metabolic panel; Future    Hemoglobin A1C; Future    Tirzepatide (Mounjaro) 12.5 MG/0.5ML SOAJ; Inject 12.5 mg under the skin once a week

## 2025-06-18 NOTE — ASSESSMENT & PLAN NOTE
Stable  Advised indication for zepbound  Orders:    Tirzepatide (Mounjaro) 12.5 MG/0.5ML SOAJ; Inject 12.5 mg under the skin once a week

## 2025-06-23 ENCOUNTER — TELEPHONE (OUTPATIENT)
Dept: FAMILY MEDICINE CLINIC | Facility: CLINIC | Age: 50
End: 2025-06-23

## 2025-06-23 NOTE — TELEPHONE ENCOUNTER
Please advise if patient pays out of pocket or uses insurance. 4/11/25 telephone encounter states it costs too much even with his insurance.

## 2025-06-23 NOTE — TELEPHONE ENCOUNTER
Prior Authorization   [facility]: [id number] [facility] 12.5 MG/0.5 ML auto-injectors  Valenchie Previl CMA

## 2025-06-24 ENCOUNTER — TELEPHONE (OUTPATIENT)
Dept: FAMILY MEDICINE CLINIC | Facility: CLINIC | Age: 50
End: 2025-06-24

## 2025-06-25 ENCOUNTER — TELEPHONE (OUTPATIENT)
Dept: FAMILY MEDICINE CLINIC | Facility: CLINIC | Age: 50
End: 2025-06-25

## 2025-06-26 NOTE — TELEPHONE ENCOUNTER
Duplicate encounter created, please see telephone encounter from 6/23/2025 regarding Mounjaro PA status. Please review patient's chart to see if there is already an encounter regarding the medication in question and to document anything regarding this medication in regards to anything regarding the authorization process etc before creating another encounter. Thank You. Patient pays out of pocket for medication.

## 2025-07-01 DIAGNOSIS — I10 BENIGN ESSENTIAL HYPERTENSION: ICD-10-CM

## 2025-07-02 RX ORDER — LOSARTAN POTASSIUM AND HYDROCHLOROTHIAZIDE 12.5; 1 MG/1; MG/1
1 TABLET ORAL DAILY
Qty: 30 TABLET | Refills: 5 | Status: SHIPPED | OUTPATIENT
Start: 2025-07-02

## 2025-07-02 RX ORDER — PROPRANOLOL HYDROCHLORIDE 160 MG/1
160 CAPSULE, EXTENDED RELEASE ORAL DAILY
Qty: 90 CAPSULE | Refills: 1 | Status: SHIPPED | OUTPATIENT
Start: 2025-07-02

## 2025-07-28 DIAGNOSIS — N18.1 TYPE 2 DIABETES MELLITUS WITH STAGE 1 CHRONIC KIDNEY DISEASE, WITHOUT LONG-TERM CURRENT USE OF INSULIN  (HCC): ICD-10-CM

## 2025-07-28 DIAGNOSIS — E11.22 TYPE 2 DIABETES MELLITUS WITH STAGE 1 CHRONIC KIDNEY DISEASE, WITHOUT LONG-TERM CURRENT USE OF INSULIN  (HCC): ICD-10-CM

## 2025-07-28 DIAGNOSIS — G47.33 OBSTRUCTIVE SLEEP APNEA: ICD-10-CM

## 2025-07-28 RX ORDER — TIRZEPATIDE 15 MG/.5ML
15 INJECTION, SOLUTION SUBCUTANEOUS WEEKLY
Qty: 6 ML | Refills: 1 | Status: SHIPPED | OUTPATIENT
Start: 2025-07-28

## 2025-07-30 LAB
LEFT EYE DIABETIC RETINOPATHY: NORMAL
RIGHT EYE DIABETIC RETINOPATHY: NORMAL